# Patient Record
Sex: FEMALE | Race: WHITE | NOT HISPANIC OR LATINO | Employment: OTHER | URBAN - METROPOLITAN AREA
[De-identification: names, ages, dates, MRNs, and addresses within clinical notes are randomized per-mention and may not be internally consistent; named-entity substitution may affect disease eponyms.]

---

## 2021-09-22 ENCOUNTER — HOSPITAL ENCOUNTER (EMERGENCY)
Facility: HOSPITAL | Age: 51
Discharge: HOME | End: 2021-09-22
Attending: EMERGENCY MEDICINE | Admitting: EMERGENCY MEDICINE

## 2021-09-22 ENCOUNTER — APPOINTMENT (OUTPATIENT)
Dept: CT IMAGING | Facility: HOSPITAL | Age: 51
End: 2021-09-22

## 2021-09-22 VITALS
HEART RATE: 66 BPM | RESPIRATION RATE: 16 BRPM | OXYGEN SATURATION: 100 % | TEMPERATURE: 97.5 F | DIASTOLIC BLOOD PRESSURE: 64 MMHG | BODY MASS INDEX: 20.14 KG/M2 | HEIGHT: 64 IN | SYSTOLIC BLOOD PRESSURE: 128 MMHG | WEIGHT: 118 LBS

## 2021-09-22 DIAGNOSIS — R10.13 EPIGASTRIC PAIN: Primary | ICD-10-CM

## 2021-09-22 DIAGNOSIS — R11.0 NAUSEA: ICD-10-CM

## 2021-09-22 LAB
ALBUMIN SERPL-MCNC: 4.4 G/DL (ref 3.5–5.2)
ALBUMIN/GLOB SERPL: 1.4 G/DL
ALP SERPL-CCNC: 92 U/L (ref 39–117)
ALT SERPL W P-5'-P-CCNC: 13 U/L (ref 1–33)
AMPHET+METHAMPHET UR QL: NEGATIVE
ANION GAP SERPL CALCULATED.3IONS-SCNC: 11.6 MMOL/L (ref 5–15)
AST SERPL-CCNC: 14 U/L (ref 1–32)
BARBITURATES UR QL SCN: NEGATIVE
BASOPHILS # BLD AUTO: 0.03 10*3/MM3 (ref 0–0.2)
BASOPHILS NFR BLD AUTO: 0.6 % (ref 0–1.5)
BENZODIAZ UR QL SCN: NEGATIVE
BILIRUB SERPL-MCNC: 0.2 MG/DL (ref 0–1.2)
BILIRUB UR QL STRIP: NEGATIVE
BUN SERPL-MCNC: 11 MG/DL (ref 6–20)
BUN/CREAT SERPL: 12.6 (ref 7–25)
CALCIUM SPEC-SCNC: 9.9 MG/DL (ref 8.6–10.5)
CANNABINOIDS SERPL QL: NEGATIVE
CHLORIDE SERPL-SCNC: 107 MMOL/L (ref 98–107)
CLARITY UR: CLEAR
CO2 SERPL-SCNC: 24.4 MMOL/L (ref 22–29)
COCAINE UR QL: NEGATIVE
COLOR UR: YELLOW
CREAT SERPL-MCNC: 0.87 MG/DL (ref 0.57–1)
D-LACTATE SERPL-SCNC: 1.1 MMOL/L (ref 0.5–2)
DEPRECATED RDW RBC AUTO: 43.4 FL (ref 37–54)
EOSINOPHIL # BLD AUTO: 0.01 10*3/MM3 (ref 0–0.4)
EOSINOPHIL NFR BLD AUTO: 0.2 % (ref 0.3–6.2)
ERYTHROCYTE [DISTWIDTH] IN BLOOD BY AUTOMATED COUNT: 12.5 % (ref 12.3–15.4)
ETHANOL BLD-MCNC: <10 MG/DL (ref 0–10)
ETHANOL UR QL: <0.01 %
GFR SERPL CREATININE-BSD FRML MDRD: 68 ML/MIN/1.73
GLOBULIN UR ELPH-MCNC: 3.2 GM/DL
GLUCOSE SERPL-MCNC: 99 MG/DL (ref 65–99)
GLUCOSE UR STRIP-MCNC: NEGATIVE MG/DL
HCT VFR BLD AUTO: 37.8 % (ref 34–46.6)
HGB BLD-MCNC: 12.7 G/DL (ref 12–15.9)
HGB UR QL STRIP.AUTO: NEGATIVE
IMM GRANULOCYTES # BLD AUTO: 0.01 10*3/MM3 (ref 0–0.05)
IMM GRANULOCYTES NFR BLD AUTO: 0.2 % (ref 0–0.5)
KETONES UR QL STRIP: NEGATIVE
LEUKOCYTE ESTERASE UR QL STRIP.AUTO: NEGATIVE
LIPASE SERPL-CCNC: 34 U/L (ref 13–60)
LYMPHOCYTES # BLD AUTO: 1.2 10*3/MM3 (ref 0.7–3.1)
LYMPHOCYTES NFR BLD AUTO: 23.3 % (ref 19.6–45.3)
MAGNESIUM SERPL-MCNC: 2.2 MG/DL (ref 1.6–2.6)
MCH RBC QN AUTO: 31.5 PG (ref 26.6–33)
MCHC RBC AUTO-ENTMCNC: 33.6 G/DL (ref 31.5–35.7)
MCV RBC AUTO: 93.8 FL (ref 79–97)
METHADONE UR QL SCN: NEGATIVE
MONOCYTES # BLD AUTO: 0.41 10*3/MM3 (ref 0.1–0.9)
MONOCYTES NFR BLD AUTO: 7.9 % (ref 5–12)
NEUTROPHILS NFR BLD AUTO: 3.5 10*3/MM3 (ref 1.7–7)
NEUTROPHILS NFR BLD AUTO: 67.8 % (ref 42.7–76)
NITRITE UR QL STRIP: NEGATIVE
NRBC BLD AUTO-RTO: 0 /100 WBC (ref 0–0.2)
OPIATES UR QL: NEGATIVE
OXYCODONE UR QL SCN: NEGATIVE
PH UR STRIP.AUTO: 8.5 [PH] (ref 5–8)
PLATELET # BLD AUTO: 299 10*3/MM3 (ref 140–450)
PMV BLD AUTO: 8.9 FL (ref 6–12)
POTASSIUM SERPL-SCNC: 3.7 MMOL/L (ref 3.5–5.2)
PROCALCITONIN SERPL-MCNC: 0.05 NG/ML (ref 0–0.25)
PROT SERPL-MCNC: 7.6 G/DL (ref 6–8.5)
PROT UR QL STRIP: NEGATIVE
RBC # BLD AUTO: 4.03 10*6/MM3 (ref 3.77–5.28)
SODIUM SERPL-SCNC: 143 MMOL/L (ref 136–145)
SP GR UR STRIP: <1.005 (ref 1–1.03)
UROBILINOGEN UR QL STRIP: ABNORMAL
WBC # BLD AUTO: 5.16 10*3/MM3 (ref 3.4–10.8)

## 2021-09-22 PROCEDURE — 83605 ASSAY OF LACTIC ACID: CPT | Performed by: EMERGENCY MEDICINE

## 2021-09-22 PROCEDURE — 74177 CT ABD & PELVIS W/CONTRAST: CPT

## 2021-09-22 PROCEDURE — 85025 COMPLETE CBC W/AUTO DIFF WBC: CPT | Performed by: EMERGENCY MEDICINE

## 2021-09-22 PROCEDURE — 80307 DRUG TEST PRSMV CHEM ANLYZR: CPT | Performed by: EMERGENCY MEDICINE

## 2021-09-22 PROCEDURE — 84145 PROCALCITONIN (PCT): CPT | Performed by: EMERGENCY MEDICINE

## 2021-09-22 PROCEDURE — 25010000002 IOPAMIDOL 61 % SOLUTION: Performed by: EMERGENCY MEDICINE

## 2021-09-22 PROCEDURE — 99283 EMERGENCY DEPT VISIT LOW MDM: CPT

## 2021-09-22 PROCEDURE — 96375 TX/PRO/DX INJ NEW DRUG ADDON: CPT

## 2021-09-22 PROCEDURE — 96374 THER/PROPH/DIAG INJ IV PUSH: CPT

## 2021-09-22 PROCEDURE — 80053 COMPREHEN METABOLIC PANEL: CPT | Performed by: EMERGENCY MEDICINE

## 2021-09-22 PROCEDURE — 82077 ASSAY SPEC XCP UR&BREATH IA: CPT | Performed by: EMERGENCY MEDICINE

## 2021-09-22 PROCEDURE — 81003 URINALYSIS AUTO W/O SCOPE: CPT | Performed by: EMERGENCY MEDICINE

## 2021-09-22 PROCEDURE — 83735 ASSAY OF MAGNESIUM: CPT | Performed by: EMERGENCY MEDICINE

## 2021-09-22 PROCEDURE — 83690 ASSAY OF LIPASE: CPT | Performed by: EMERGENCY MEDICINE

## 2021-09-22 PROCEDURE — 25010000002 ONDANSETRON PER 1 MG: Performed by: EMERGENCY MEDICINE

## 2021-09-22 RX ORDER — METOCLOPRAMIDE 5 MG/1
5 TABLET ORAL 3 TIMES DAILY PRN
Qty: 21 TABLET | Refills: 0 | Status: SHIPPED | OUTPATIENT
Start: 2021-09-22 | End: 2021-09-22

## 2021-09-22 RX ORDER — NIZATIDINE 150 MG/1
150 CAPSULE ORAL DAILY
COMMUNITY
Start: 2021-09-21 | End: 2021-10-03

## 2021-09-22 RX ORDER — HYDROXYZINE HYDROCHLORIDE 25 MG/1
50 TABLET, FILM COATED ORAL
COMMUNITY
Start: 2021-09-21 | End: 2021-10-03

## 2021-09-22 RX ORDER — PROMETHAZINE HYDROCHLORIDE 25 MG/1
25 TABLET ORAL EVERY 6 HOURS PRN
Qty: 21 TABLET | Refills: 0 | Status: SHIPPED | OUTPATIENT
Start: 2021-09-22 | End: 2021-10-01

## 2021-09-22 RX ORDER — PROMETHAZINE HYDROCHLORIDE 25 MG/1
25 TABLET ORAL
COMMUNITY
Start: 2021-09-21 | End: 2021-09-22

## 2021-09-22 RX ORDER — FAMOTIDINE 10 MG/ML
20 INJECTION, SOLUTION INTRAVENOUS ONCE
Status: COMPLETED | OUTPATIENT
Start: 2021-09-22 | End: 2021-09-22

## 2021-09-22 RX ORDER — SUCRALFATE 1 G/1
1 TABLET ORAL 4 TIMES DAILY
Qty: 120 TABLET | Refills: 0 | Status: SHIPPED | OUTPATIENT
Start: 2021-09-22 | End: 2021-10-03

## 2021-09-22 RX ORDER — OMEPRAZOLE 40 MG/1
40 CAPSULE, DELAYED RELEASE ORAL DAILY
COMMUNITY
Start: 2021-09-17 | End: 2021-10-11 | Stop reason: SINTOL

## 2021-09-22 RX ORDER — HYDROCODONE BITARTRATE AND ACETAMINOPHEN 5; 325 MG/1; MG/1
1 TABLET ORAL ONCE
Status: COMPLETED | OUTPATIENT
Start: 2021-09-22 | End: 2021-09-22

## 2021-09-22 RX ORDER — ONDANSETRON 2 MG/ML
4 INJECTION INTRAMUSCULAR; INTRAVENOUS ONCE
Status: COMPLETED | OUTPATIENT
Start: 2021-09-22 | End: 2021-09-22

## 2021-09-22 RX ORDER — ONDANSETRON 4 MG/1
4 TABLET, ORALLY DISINTEGRATING ORAL
Status: ON HOLD | COMMUNITY
Start: 2021-09-21 | End: 2021-09-26 | Stop reason: SDUPTHER

## 2021-09-22 RX ADMIN — SODIUM CHLORIDE, POTASSIUM CHLORIDE, SODIUM LACTATE AND CALCIUM CHLORIDE 1000 ML: 600; 310; 30; 20 INJECTION, SOLUTION INTRAVENOUS at 11:39

## 2021-09-22 RX ADMIN — HYDROCODONE BITARTRATE AND ACETAMINOPHEN 1 TABLET: 5; 325 TABLET ORAL at 13:56

## 2021-09-22 RX ADMIN — ONDANSETRON 4 MG: 2 INJECTION INTRAMUSCULAR; INTRAVENOUS at 11:38

## 2021-09-22 RX ADMIN — FAMOTIDINE 20 MG: 10 INJECTION INTRAVENOUS at 11:42

## 2021-09-22 RX ADMIN — IOPAMIDOL 85 ML: 612 INJECTION, SOLUTION INTRAVENOUS at 12:38

## 2021-09-22 NOTE — DISCHARGE INSTRUCTIONS
Take medications as prescribed, stop taking the Phenergan, continue all other current medications, GI follow-up as discussed, PCP for establishment as discussed, ED return for worsening symptoms as needed.

## 2021-09-22 NOTE — ED NOTES
Pt wearing mask. Myself had mask, and eye wear/PPE when present with pt. Hand hygiene performed before and after pt care.     Jessica Mathews, PCT  09/22/21 1104

## 2021-09-22 NOTE — ED PROVIDER NOTES
EMERGENCY DEPARTMENT ENCOUNTER    Room Number:  13/13  Date of encounter:  9/22/2021  PCP: Provider, No Known  Historian: Patient      HPI:  Chief Complaint: Abdominal pain  A complete HPI/ROS/PMH/PSH/SH/FH are unobtainable due to: None    Context: Mary Jensen is a 51 y.o. female who presents to the ED via private vehicle for evaluation for increasing abdominal pain since Friday described as epigastric in nature with some radiation up into the chest as well as down into the abdomen.  Has had severe nausea without vomiting or diarrhea.  Regular solid bowel movements without black or bloody stools.  No dysuria, decreased solid food intake but is able to drink fluids.  No fevers, chills.  Was started on omeprazole by her ENT who is she had seen for some ear issues recently, symptoms started couple weeks ago during her course of antibiotics and steroids for a sinus infection.  They did improve and now have recurred.  No similar symptoms in the past.    MEDICAL RECORD REVIEW    Chart reviewed in epic with prescription for Zofran, Atarax, Axid, and Phenergan yesterday    PAST MEDICAL HISTORY  Active Ambulatory Problems     Diagnosis Date Noted   • No Active Ambulatory Problems     Resolved Ambulatory Problems     Diagnosis Date Noted   • No Resolved Ambulatory Problems     No Additional Past Medical History         PAST SURGICAL HISTORY  No past surgical history on file.      FAMILY HISTORY  No family history on file.      SOCIAL HISTORY  Social History     Socioeconomic History   • Marital status:      Spouse name: Not on file   • Number of children: Not on file   • Years of education: Not on file   • Highest education level: Not on file         ALLERGIES  Patient has no known allergies.        REVIEW OF SYSTEMS  Review of Systems     All systems reviewed and negative except for those discussed in HPI.       PHYSICAL EXAM    I have reviewed the triage vital signs and nursing notes.    ED Triage Vitals   Temp  Heart Rate Resp BP SpO2   09/22/21 1000 09/22/21 1000 09/22/21 1000 09/22/21 1109 09/22/21 1000   97.5 °F (36.4 °C) 111 18 128/64 100 %      Temp src Heart Rate Source Patient Position BP Location FiO2 (%)   09/22/21 1000 09/22/21 1109 09/22/21 1109 09/22/21 1109 --   Infrared Monitor Lying Right arm        Physical Exam  General: Awake, alert, appears uncomfortable, nontoxic  HEENT: Mucous membranes moist, atraumatic, EOMI  Neck: Full ROM  Pulm: Symmetric chest rise, nonlabored, lungs CTAB  Cardiovascular: Regular rate and rhythm, intact distal pulses  GI: Soft, nontender, nondistended, no rebound, no guarding, bowel sounds present  MSK: Full ROM, no deformity  Skin: Warm, dry  Neuro: Alert and oriented x 3, GCS 15, moving all extremities, no focal deficits  Psych: Calm, cooperative      N95, protective eye goggles, and gloves used during this encounter. Patient in surgical mask.      LAB RESULTS  Recent Results (from the past 24 hour(s))   Urinalysis With Microscopic If Indicated (No Culture) - Urine, Clean Catch    Collection Time: 09/22/21 11:01 AM    Specimen: Urine, Clean Catch   Result Value Ref Range    Color, UA Yellow Yellow, Straw    Appearance, UA Clear Clear    pH, UA 8.5 (H) 5.0 - 8.0    Specific Gravity, UA <1.005 (L) 1.005 - 1.030    Glucose, UA Negative Negative    Ketones, UA Negative Negative    Bilirubin, UA Negative Negative    Blood, UA Negative Negative    Protein, UA Negative Negative    Leuk Esterase, UA Negative Negative    Nitrite, UA Negative Negative    Urobilinogen, UA 0.2 E.U./dL 0.2 - 1.0 E.U./dL   Urine Drug Screen - Urine, Clean Catch    Collection Time: 09/22/21 11:01 AM    Specimen: Urine, Clean Catch   Result Value Ref Range    Amphet/Methamphet, Screen Negative Negative    Barbiturates Screen, Urine Negative Negative    Benzodiazepine Screen, Urine Negative Negative    Cocaine Screen, Urine Negative Negative    Opiate Screen Negative Negative    THC, Screen, Urine Negative  Negative    Methadone Screen, Urine Negative Negative    Oxycodone Screen, Urine Negative Negative   Comprehensive Metabolic Panel    Collection Time: 09/22/21 11:20 AM    Specimen: Blood   Result Value Ref Range    Glucose 99 65 - 99 mg/dL    BUN 11 6 - 20 mg/dL    Creatinine 0.87 0.57 - 1.00 mg/dL    Sodium 143 136 - 145 mmol/L    Potassium 3.7 3.5 - 5.2 mmol/L    Chloride 107 98 - 107 mmol/L    CO2 24.4 22.0 - 29.0 mmol/L    Calcium 9.9 8.6 - 10.5 mg/dL    Total Protein 7.6 6.0 - 8.5 g/dL    Albumin 4.40 3.50 - 5.20 g/dL    ALT (SGPT) 13 1 - 33 U/L    AST (SGOT) 14 1 - 32 U/L    Alkaline Phosphatase 92 39 - 117 U/L    Total Bilirubin 0.2 0.0 - 1.2 mg/dL    eGFR Non African Amer 68 >60 mL/min/1.73    Globulin 3.2 gm/dL    A/G Ratio 1.4 g/dL    BUN/Creatinine Ratio 12.6 7.0 - 25.0    Anion Gap 11.6 5.0 - 15.0 mmol/L   Lipase    Collection Time: 09/22/21 11:20 AM    Specimen: Blood   Result Value Ref Range    Lipase 34 13 - 60 U/L   Lactic Acid, Plasma    Collection Time: 09/22/21 11:20 AM    Specimen: Blood   Result Value Ref Range    Lactate 1.1 0.5 - 2.0 mmol/L   Procalcitonin    Collection Time: 09/22/21 11:20 AM    Specimen: Blood   Result Value Ref Range    Procalcitonin 0.05 0.00 - 0.25 ng/mL   Ethanol    Collection Time: 09/22/21 11:20 AM    Specimen: Blood   Result Value Ref Range    Ethanol <10 0 - 10 mg/dL    Ethanol % <0.010 %   Magnesium    Collection Time: 09/22/21 11:20 AM    Specimen: Blood   Result Value Ref Range    Magnesium 2.2 1.6 - 2.6 mg/dL   CBC Auto Differential    Collection Time: 09/22/21 11:20 AM    Specimen: Blood   Result Value Ref Range    WBC 5.16 3.40 - 10.80 10*3/mm3    RBC 4.03 3.77 - 5.28 10*6/mm3    Hemoglobin 12.7 12.0 - 15.9 g/dL    Hematocrit 37.8 34.0 - 46.6 %    MCV 93.8 79.0 - 97.0 fL    MCH 31.5 26.6 - 33.0 pg    MCHC 33.6 31.5 - 35.7 g/dL    RDW 12.5 12.3 - 15.4 %    RDW-SD 43.4 37.0 - 54.0 fl    MPV 8.9 6.0 - 12.0 fL    Platelets 299 140 - 450 10*3/mm3    Neutrophil %  67.8 42.7 - 76.0 %    Lymphocyte % 23.3 19.6 - 45.3 %    Monocyte % 7.9 5.0 - 12.0 %    Eosinophil % 0.2 (L) 0.3 - 6.2 %    Basophil % 0.6 0.0 - 1.5 %    Immature Grans % 0.2 0.0 - 0.5 %    Neutrophils, Absolute 3.50 1.70 - 7.00 10*3/mm3    Lymphocytes, Absolute 1.20 0.70 - 3.10 10*3/mm3    Monocytes, Absolute 0.41 0.10 - 0.90 10*3/mm3    Eosinophils, Absolute 0.01 0.00 - 0.40 10*3/mm3    Basophils, Absolute 0.03 0.00 - 0.20 10*3/mm3    Immature Grans, Absolute 0.01 0.00 - 0.05 10*3/mm3    nRBC 0.0 0.0 - 0.2 /100 WBC       Ordered the above labs and independently reviewed the results.        RADIOLOGY  CT Abdomen Pelvis With Contrast    Result Date: 9/22/2021  CT ABDOMEN PELVIS W CONTRAST-  CLINICAL HISTORY: Nausea and vomiting. Abdominal pain  TECHNIQUE: Spiral CT images were acquired through the abdomen and pelvis with IV contrast only and were reconstructed in 3 mm thick axial slices.  Radiation dose reduction techniques were utilized, including automated exposure control and exposure modulation based on body size.  COMPARISON: None  FINDINGS: The liver, spleen, pancreas, kidneys, and adrenal glands appear within normal limits. The stomach and small and large bowel are unremarkable. There is no bowel distention. The appendix is well-seen and appears normal. Uterus and ovaries are within normal limits. There are no abnormal masses or fluid collections in the abdomen or pelvis. There are no hernias.      Normal CT scan of the abdomen and pelvis.  This report was finalized on 9/22/2021 1:06 PM by Dr. Adryan Christianson M.D.        I ordered the above noted radiological studies. Reviewed by me.  See dictation for official radiology interpretation.      PROCEDURES    Procedures      MEDICATIONS GIVEN IN ER    Medications   lactated ringers bolus 1,000 mL (0 mL Intravenous Stopped 9/22/21 1356)   ondansetron (ZOFRAN) injection 4 mg (4 mg Intravenous Given 9/22/21 1138)   famotidine (PEPCID) injection 20 mg (20 mg  Intravenous Given 9/22/21 1142)   iopamidol (ISOVUE-300) 61 % injection 100 mL (85 mL Intravenous Given by Other 9/22/21 1238)   HYDROcodone-acetaminophen (NORCO) 5-325 MG per tablet 1 tablet (1 tablet Oral Given 9/22/21 1356)         PROGRESS, DATA ANALYSIS, CONSULTS, AND MEDICAL DECISION MAKING    All labs have been independently reviewed by me.  All radiology studies have been reviewed by me and discussed with radiologist dictating the report.   EKG's independently viewed and interpreted by me.  Discussion below represents my analysis of pertinent findings related to patient's condition, differential diagnosis, treatment plan and final disposition.    Initial concern for cholelithiasis versus cholecystitis, pancreatitis, gastritis, peptic ulcer, esophagitis, among others.    ED Course as of Sep 22 1437   Wed Sep 22, 2021   1149 WBC: 5.16 [DC]   1149 Hemoglobin: 12.7 [DC]   1149 Ketones, UA: Negative [DC]   1238 Glucose: 99 [DC]   1238 BUN: 11 [DC]   1238 Creatinine: 0.87 [DC]   1238 CO2: 24.4 [DC]   1238 Anion Gap: 11.6 [DC]   1238 Lactate: 1.1 [DC]   1314 Discussed with Dr. Christianson, radiologist, no acute findings on CT scan.    [DC]      ED Course User Index  [DC] Victor M Grimes MD     Patient with much improved appearance overall, no emergent findings on labs or imaging today.  Suspect gastritis versus peptic ulcer, no suspicion for ruptured at this time.  Will place on Carafate, advised compliance with previously prescribed omeprazole, GI follow-up, and ED return for worsening symptoms as needed.  PCP establishment recommend.     AS OF 14:37 EDT VITALS:    BP - 128/64  HR - 66  TEMP - 97.5 °F (36.4 °C) (Infrared)  02 SATS - 100%        DIAGNOSIS  Final diagnoses:   Epigastric pain   Nausea         DISPOSITION  DISCHARGE    Patient discharged in stable condition.    Reviewed implications of results, diagnosis, meds, responsibility to follow up, warning signs and symptoms of possible worsening, potential  complications and reasons to return to ER.    Patient/Family voiced understanding of above instructions.    Discussed plan for discharge, as there is no emergent indication for admission. Patient referred to primary care provider for BP management due to today's BP. Pt/family is agreeable and understands need for follow up and repeat testing.  Pt is aware that discharge does not mean that nothing is wrong but it indicates no emergency is present that requires admission and they must continue care with follow-up as given below or physician of their choice.     FOLLOW-UP  [facility] Emergency Department  [address]  [facility] [id number] [phone]    As needed, If symptoms worsen    PATIENT CONNECTION - [place] [address]  [phone]  Call   To establish PCP as needed    [facility]YS[facility] REFERR[facility]  [place] [address]  [phone]  Call   To establish PCP as needed    [facility]  [address]  [address]  [facility] [address] [phone]  Schedule an appointment as soon as possible for a visit            Medication List      New Prescriptions    promethazine 25 MG tablet  Commonly known as: PHENERGAN  Take 1 tablet by mouth Every 6 (Six) Hours As Needed for Nausea or Vomiting.     sucralfate 1 g tablet  Commonly known as: CARAFATE  Take 1 tablet by mouth 4 (Four) Times a Day.           Where to Get Your Medications      You can get these medications from any pharmacy    Bring a paper prescription for each of these medications  · promethazine 25 MG tablet  · sucralfate 1 g tablet                    Victor M Grimes MD  09/22/21 2706

## 2021-09-22 NOTE — ED TRIAGE NOTES
Pt to ER via PV. Pt c/o generalized abdominal pain that started this past Friday. Pt c/o nausea, no vomiting.     Patient in mask. This RN in appropriate PPE throughout the patient's entire encounter.

## 2021-09-25 ENCOUNTER — APPOINTMENT (OUTPATIENT)
Dept: GENERAL RADIOLOGY | Facility: HOSPITAL | Age: 51
End: 2021-09-25

## 2021-09-25 ENCOUNTER — HOSPITAL ENCOUNTER (OUTPATIENT)
Facility: HOSPITAL | Age: 51
Discharge: HOME | End: 2021-09-26
Attending: EMERGENCY MEDICINE | Admitting: SURGERY

## 2021-09-25 ENCOUNTER — ANESTHESIA EVENT (OUTPATIENT)
Dept: PERIOP | Facility: HOSPITAL | Age: 51
End: 2021-09-25

## 2021-09-25 ENCOUNTER — APPOINTMENT (OUTPATIENT)
Dept: CT IMAGING | Facility: HOSPITAL | Age: 51
End: 2021-09-25

## 2021-09-25 ENCOUNTER — ANESTHESIA (OUTPATIENT)
Dept: PERIOP | Facility: HOSPITAL | Age: 51
End: 2021-09-25

## 2021-09-25 ENCOUNTER — APPOINTMENT (OUTPATIENT)
Dept: ULTRASOUND IMAGING | Facility: HOSPITAL | Age: 51
End: 2021-09-25

## 2021-09-25 DIAGNOSIS — K80.10 CALCULUS OF GALLBLADDER WITH CHOLECYSTITIS WITHOUT BILIARY OBSTRUCTION, UNSPECIFIED CHOLECYSTITIS ACUITY: Primary | ICD-10-CM

## 2021-09-25 DIAGNOSIS — K80.12 CALCULUS OF GALLBLADDER WITH ACUTE ON CHRONIC CHOLECYSTITIS WITHOUT OBSTRUCTION: ICD-10-CM

## 2021-09-25 DIAGNOSIS — K80.50 BILIARY COLIC: ICD-10-CM

## 2021-09-25 DIAGNOSIS — R10.13 EPIGASTRIC ABDOMINAL PAIN: ICD-10-CM

## 2021-09-25 LAB
ALBUMIN SERPL-MCNC: 4.5 G/DL (ref 3.5–5.2)
ALBUMIN/GLOB SERPL: 1.3 G/DL
ALP SERPL-CCNC: 93 U/L (ref 39–117)
ALT SERPL W P-5'-P-CCNC: 13 U/L (ref 1–33)
ANION GAP SERPL CALCULATED.3IONS-SCNC: 12.7 MMOL/L (ref 5–15)
AST SERPL-CCNC: 17 U/L (ref 1–32)
BASOPHILS # BLD AUTO: 0.01 10*3/MM3 (ref 0–0.2)
BASOPHILS NFR BLD AUTO: 0.2 % (ref 0–1.5)
BILIRUB SERPL-MCNC: 0.3 MG/DL (ref 0–1.2)
BILIRUB UR QL STRIP: NEGATIVE
BUN SERPL-MCNC: 9 MG/DL (ref 6–20)
BUN/CREAT SERPL: 11.8 (ref 7–25)
CALCIUM SPEC-SCNC: 9.9 MG/DL (ref 8.6–10.5)
CHLORIDE SERPL-SCNC: 105 MMOL/L (ref 98–107)
CLARITY UR: CLEAR
CO2 SERPL-SCNC: 22.3 MMOL/L (ref 22–29)
COLOR UR: YELLOW
CREAT SERPL-MCNC: 0.76 MG/DL (ref 0.57–1)
D DIMER PPP FEU-MCNC: 0.54 MCGFEU/ML (ref 0–0.49)
DEPRECATED RDW RBC AUTO: 43.7 FL (ref 37–54)
EOSINOPHIL # BLD AUTO: 0.06 10*3/MM3 (ref 0–0.4)
EOSINOPHIL NFR BLD AUTO: 1.3 % (ref 0.3–6.2)
ERYTHROCYTE [DISTWIDTH] IN BLOOD BY AUTOMATED COUNT: 12.6 % (ref 12.3–15.4)
GFR SERPL CREATININE-BSD FRML MDRD: 80 ML/MIN/1.73
GLOBULIN UR ELPH-MCNC: 3.4 GM/DL
GLUCOSE SERPL-MCNC: 91 MG/DL (ref 65–99)
GLUCOSE UR STRIP-MCNC: NEGATIVE MG/DL
HCT VFR BLD AUTO: 40.7 % (ref 34–46.6)
HGB BLD-MCNC: 13.6 G/DL (ref 12–15.9)
HGB UR QL STRIP.AUTO: NEGATIVE
IMM GRANULOCYTES # BLD AUTO: 0 10*3/MM3 (ref 0–0.05)
IMM GRANULOCYTES NFR BLD AUTO: 0 % (ref 0–0.5)
KETONES UR QL STRIP: NEGATIVE
LEUKOCYTE ESTERASE UR QL STRIP.AUTO: NEGATIVE
LIPASE SERPL-CCNC: 45 U/L (ref 13–60)
LYMPHOCYTES # BLD AUTO: 1.44 10*3/MM3 (ref 0.7–3.1)
LYMPHOCYTES NFR BLD AUTO: 30.9 % (ref 19.6–45.3)
MCH RBC QN AUTO: 31.2 PG (ref 26.6–33)
MCHC RBC AUTO-ENTMCNC: 33.4 G/DL (ref 31.5–35.7)
MCV RBC AUTO: 93.3 FL (ref 79–97)
MONOCYTES # BLD AUTO: 0.34 10*3/MM3 (ref 0.1–0.9)
MONOCYTES NFR BLD AUTO: 7.3 % (ref 5–12)
NEUTROPHILS NFR BLD AUTO: 2.81 10*3/MM3 (ref 1.7–7)
NEUTROPHILS NFR BLD AUTO: 60.3 % (ref 42.7–76)
NITRITE UR QL STRIP: NEGATIVE
NRBC BLD AUTO-RTO: 0 /100 WBC (ref 0–0.2)
PH UR STRIP.AUTO: 8 [PH] (ref 5–8)
PLATELET # BLD AUTO: 313 10*3/MM3 (ref 140–450)
PMV BLD AUTO: 8.8 FL (ref 6–12)
POTASSIUM SERPL-SCNC: 3.7 MMOL/L (ref 3.5–5.2)
PROT SERPL-MCNC: 7.9 G/DL (ref 6–8.5)
PROT UR QL STRIP: NEGATIVE
RBC # BLD AUTO: 4.36 10*6/MM3 (ref 3.77–5.28)
SARS-COV-2 RNA PNL SPEC NAA+PROBE: NOT DETECTED
SODIUM SERPL-SCNC: 140 MMOL/L (ref 136–145)
SP GR UR STRIP: >=1.03 (ref 1–1.03)
TROPONIN T SERPL-MCNC: <0.01 NG/ML (ref 0–0.03)
UROBILINOGEN UR QL STRIP: NORMAL
WBC # BLD AUTO: 4.66 10*3/MM3 (ref 3.4–10.8)

## 2021-09-25 PROCEDURE — 25010000002 MORPHINE PER 10 MG: Performed by: EMERGENCY MEDICINE

## 2021-09-25 PROCEDURE — 96375 TX/PRO/DX INJ NEW DRUG ADDON: CPT

## 2021-09-25 PROCEDURE — 25010000002 DEXAMETHASONE PER 1 MG: Performed by: NURSE ANESTHETIST, CERTIFIED REGISTERED

## 2021-09-25 PROCEDURE — 96376 TX/PRO/DX INJ SAME DRUG ADON: CPT

## 2021-09-25 PROCEDURE — 99284 EMERGENCY DEPT VISIT MOD MDM: CPT

## 2021-09-25 PROCEDURE — 47562 LAPAROSCOPIC CHOLECYSTECTOMY: CPT | Performed by: SURGERY

## 2021-09-25 PROCEDURE — G0378 HOSPITAL OBSERVATION PER HR: HCPCS

## 2021-09-25 PROCEDURE — C1889 IMPLANT/INSERT DEVICE, NOC: HCPCS | Performed by: SURGERY

## 2021-09-25 PROCEDURE — 0 IOTHALAMATE 60 % SOLUTION: Performed by: SURGERY

## 2021-09-25 PROCEDURE — 93005 ELECTROCARDIOGRAM TRACING: CPT | Performed by: EMERGENCY MEDICINE

## 2021-09-25 PROCEDURE — 47562 LAPAROSCOPIC CHOLECYSTECTOMY: CPT | Performed by: SPECIALIST/TECHNOLOGIST, OTHER

## 2021-09-25 PROCEDURE — 76705 ECHO EXAM OF ABDOMEN: CPT

## 2021-09-25 PROCEDURE — 93010 ELECTROCARDIOGRAM REPORT: CPT | Performed by: INTERNAL MEDICINE

## 2021-09-25 PROCEDURE — 25010000002 ONDANSETRON PER 1 MG: Performed by: NURSE ANESTHETIST, CERTIFIED REGISTERED

## 2021-09-25 PROCEDURE — 25010000002 KETOROLAC TROMETHAMINE PER 15 MG: Performed by: SURGERY

## 2021-09-25 PROCEDURE — 25010000002 ONDANSETRON PER 1 MG: Performed by: PHYSICIAN ASSISTANT

## 2021-09-25 PROCEDURE — 25010000002 ONDANSETRON PER 1 MG: Performed by: SURGERY

## 2021-09-25 PROCEDURE — 99219 PR INITIAL OBSERVATION CARE/DAY 50 MINUTES: CPT | Performed by: SURGERY

## 2021-09-25 PROCEDURE — 25010000002 NEOSTIGMINE 5 MG/10ML SOLUTION: Performed by: NURSE ANESTHETIST, CERTIFIED REGISTERED

## 2021-09-25 PROCEDURE — 25010000002 MIDAZOLAM PER 1 MG: Performed by: ANESTHESIOLOGY

## 2021-09-25 PROCEDURE — 25010000003 CEFAZOLIN IN DEXTROSE 2-4 GM/100ML-% SOLUTION: Performed by: SURGERY

## 2021-09-25 PROCEDURE — C9803 HOPD COVID-19 SPEC COLLECT: HCPCS

## 2021-09-25 PROCEDURE — 85025 COMPLETE CBC W/AUTO DIFF WBC: CPT | Performed by: PHYSICIAN ASSISTANT

## 2021-09-25 PROCEDURE — 80053 COMPREHEN METABOLIC PANEL: CPT | Performed by: PHYSICIAN ASSISTANT

## 2021-09-25 PROCEDURE — 25010000002 PHENYLEPHRINE PER 1 ML: Performed by: NURSE ANESTHETIST, CERTIFIED REGISTERED

## 2021-09-25 PROCEDURE — 84484 ASSAY OF TROPONIN QUANT: CPT | Performed by: PHYSICIAN ASSISTANT

## 2021-09-25 PROCEDURE — 81003 URINALYSIS AUTO W/O SCOPE: CPT | Performed by: PHYSICIAN ASSISTANT

## 2021-09-25 PROCEDURE — 71275 CT ANGIOGRAPHY CHEST: CPT

## 2021-09-25 PROCEDURE — 74177 CT ABD & PELVIS W/CONTRAST: CPT

## 2021-09-25 PROCEDURE — 25010000002 HYDROMORPHONE PER 4 MG: Performed by: SURGERY

## 2021-09-25 PROCEDURE — 88304 TISSUE EXAM BY PATHOLOGIST: CPT | Performed by: SURGERY

## 2021-09-25 PROCEDURE — 87635 SARS-COV-2 COVID-19 AMP PRB: CPT | Performed by: PHYSICIAN ASSISTANT

## 2021-09-25 PROCEDURE — 83690 ASSAY OF LIPASE: CPT | Performed by: PHYSICIAN ASSISTANT

## 2021-09-25 PROCEDURE — 0 IOPAMIDOL PER 1 ML: Performed by: EMERGENCY MEDICINE

## 2021-09-25 PROCEDURE — 25010000002 ONDANSETRON PER 1 MG: Performed by: EMERGENCY MEDICINE

## 2021-09-25 PROCEDURE — 25010000002 FENTANYL CITRATE (PF) 50 MCG/ML SOLUTION: Performed by: ANESTHESIOLOGY

## 2021-09-25 PROCEDURE — 25010000002 KETOROLAC TROMETHAMINE PER 15 MG: Performed by: NURSE ANESTHETIST, CERTIFIED REGISTERED

## 2021-09-25 PROCEDURE — 25010000002 PROPOFOL 10 MG/ML EMULSION: Performed by: NURSE ANESTHETIST, CERTIFIED REGISTERED

## 2021-09-25 PROCEDURE — 85379 FIBRIN DEGRADATION QUANT: CPT | Performed by: PHYSICIAN ASSISTANT

## 2021-09-25 PROCEDURE — 71045 X-RAY EXAM CHEST 1 VIEW: CPT

## 2021-09-25 PROCEDURE — 96374 THER/PROPH/DIAG INJ IV PUSH: CPT

## 2021-09-25 PROCEDURE — 25010000002 HYDROMORPHONE PER 4 MG: Performed by: NURSE ANESTHETIST, CERTIFIED REGISTERED

## 2021-09-25 PROCEDURE — 25010000002 FENTANYL CITRATE (PF) 50 MCG/ML SOLUTION: Performed by: NURSE ANESTHETIST, CERTIFIED REGISTERED

## 2021-09-25 DEVICE — LIGAMAX 5 MM ENDOSCOPIC MULTIPLE CLIP APPLIER
Type: IMPLANTABLE DEVICE | Site: ABDOMEN | Status: FUNCTIONAL
Brand: LIGAMAX

## 2021-09-25 RX ORDER — HYDRALAZINE HYDROCHLORIDE 20 MG/ML
5 INJECTION INTRAMUSCULAR; INTRAVENOUS
Status: DISCONTINUED | OUTPATIENT
Start: 2021-09-25 | End: 2021-09-25 | Stop reason: HOSPADM

## 2021-09-25 RX ORDER — NALOXONE HCL 0.4 MG/ML
0.2 VIAL (ML) INJECTION AS NEEDED
Status: DISCONTINUED | OUTPATIENT
Start: 2021-09-25 | End: 2021-09-25 | Stop reason: HOSPADM

## 2021-09-25 RX ORDER — KETOROLAC TROMETHAMINE 30 MG/ML
INJECTION, SOLUTION INTRAMUSCULAR; INTRAVENOUS AS NEEDED
Status: DISCONTINUED | OUTPATIENT
Start: 2021-09-25 | End: 2021-09-25 | Stop reason: SURG

## 2021-09-25 RX ORDER — PROMETHAZINE HYDROCHLORIDE 25 MG/1
25 TABLET ORAL ONCE AS NEEDED
Status: DISCONTINUED | OUTPATIENT
Start: 2021-09-25 | End: 2021-09-25 | Stop reason: HOSPADM

## 2021-09-25 RX ORDER — FLUMAZENIL 0.1 MG/ML
0.2 INJECTION INTRAVENOUS AS NEEDED
Status: DISCONTINUED | OUTPATIENT
Start: 2021-09-25 | End: 2021-09-25 | Stop reason: HOSPADM

## 2021-09-25 RX ORDER — CEFAZOLIN SODIUM 2 G/100ML
2 INJECTION, SOLUTION INTRAVENOUS ONCE
Status: COMPLETED | OUTPATIENT
Start: 2021-09-25 | End: 2021-09-25

## 2021-09-25 RX ORDER — DIPHENHYDRAMINE HYDROCHLORIDE 50 MG/ML
12.5 INJECTION INTRAMUSCULAR; INTRAVENOUS
Status: DISCONTINUED | OUTPATIENT
Start: 2021-09-25 | End: 2021-09-25 | Stop reason: HOSPADM

## 2021-09-25 RX ORDER — LIDOCAINE HYDROCHLORIDE 10 MG/ML
0.5 INJECTION, SOLUTION EPIDURAL; INFILTRATION; INTRACAUDAL; PERINEURAL ONCE AS NEEDED
Status: DISCONTINUED | OUTPATIENT
Start: 2021-09-25 | End: 2021-09-25 | Stop reason: HOSPADM

## 2021-09-25 RX ORDER — MAGNESIUM HYDROXIDE 1200 MG/15ML
LIQUID ORAL AS NEEDED
Status: DISCONTINUED | OUTPATIENT
Start: 2021-09-25 | End: 2021-09-25 | Stop reason: HOSPADM

## 2021-09-25 RX ORDER — MORPHINE SULFATE 2 MG/ML
4 INJECTION, SOLUTION INTRAMUSCULAR; INTRAVENOUS ONCE
Status: COMPLETED | OUTPATIENT
Start: 2021-09-25 | End: 2021-09-25

## 2021-09-25 RX ORDER — HYDROMORPHONE HYDROCHLORIDE 1 MG/ML
0.5 INJECTION, SOLUTION INTRAMUSCULAR; INTRAVENOUS; SUBCUTANEOUS
Status: DISCONTINUED | OUTPATIENT
Start: 2021-09-25 | End: 2021-09-25 | Stop reason: HOSPADM

## 2021-09-25 RX ORDER — ONDANSETRON 2 MG/ML
INJECTION INTRAMUSCULAR; INTRAVENOUS AS NEEDED
Status: DISCONTINUED | OUTPATIENT
Start: 2021-09-25 | End: 2021-09-25 | Stop reason: SURG

## 2021-09-25 RX ORDER — ONDANSETRON 2 MG/ML
4 INJECTION INTRAMUSCULAR; INTRAVENOUS ONCE
Status: COMPLETED | OUTPATIENT
Start: 2021-09-25 | End: 2021-09-25

## 2021-09-25 RX ORDER — FENTANYL CITRATE 50 UG/ML
INJECTION, SOLUTION INTRAMUSCULAR; INTRAVENOUS AS NEEDED
Status: DISCONTINUED | OUTPATIENT
Start: 2021-09-25 | End: 2021-09-25 | Stop reason: SURG

## 2021-09-25 RX ORDER — GLYCOPYRROLATE 0.2 MG/ML
INJECTION INTRAMUSCULAR; INTRAVENOUS AS NEEDED
Status: DISCONTINUED | OUTPATIENT
Start: 2021-09-25 | End: 2021-09-25 | Stop reason: SURG

## 2021-09-25 RX ORDER — ONDANSETRON 2 MG/ML
4 INJECTION INTRAMUSCULAR; INTRAVENOUS EVERY 6 HOURS PRN
Status: DISCONTINUED | OUTPATIENT
Start: 2021-09-25 | End: 2021-09-26 | Stop reason: HOSPADM

## 2021-09-25 RX ORDER — PROPOFOL 10 MG/ML
VIAL (ML) INTRAVENOUS AS NEEDED
Status: DISCONTINUED | OUTPATIENT
Start: 2021-09-25 | End: 2021-09-25 | Stop reason: SURG

## 2021-09-25 RX ORDER — BUPIVACAINE HYDROCHLORIDE 2.5 MG/ML
INJECTION, SOLUTION EPIDURAL; INFILTRATION; INTRACAUDAL AS NEEDED
Status: DISCONTINUED | OUTPATIENT
Start: 2021-09-25 | End: 2021-09-25 | Stop reason: HOSPADM

## 2021-09-25 RX ORDER — NEOSTIGMINE METHYLSULFATE 0.5 MG/ML
INJECTION, SOLUTION INTRAVENOUS AS NEEDED
Status: DISCONTINUED | OUTPATIENT
Start: 2021-09-25 | End: 2021-09-25 | Stop reason: SURG

## 2021-09-25 RX ORDER — ONDANSETRON 2 MG/ML
4 INJECTION INTRAMUSCULAR; INTRAVENOUS ONCE AS NEEDED
Status: DISCONTINUED | OUTPATIENT
Start: 2021-09-25 | End: 2021-09-25 | Stop reason: HOSPADM

## 2021-09-25 RX ORDER — HYDROMORPHONE HYDROCHLORIDE 1 MG/ML
0.25 INJECTION, SOLUTION INTRAMUSCULAR; INTRAVENOUS; SUBCUTANEOUS EVERY 4 HOURS PRN
Status: DISCONTINUED | OUTPATIENT
Start: 2021-09-25 | End: 2021-09-26 | Stop reason: HOSPADM

## 2021-09-25 RX ORDER — MIDAZOLAM HYDROCHLORIDE 1 MG/ML
1 INJECTION INTRAMUSCULAR; INTRAVENOUS
Status: DISCONTINUED | OUTPATIENT
Start: 2021-09-25 | End: 2021-09-25 | Stop reason: HOSPADM

## 2021-09-25 RX ORDER — HYDROMORPHONE HCL 110MG/55ML
PATIENT CONTROLLED ANALGESIA SYRINGE INTRAVENOUS AS NEEDED
Status: DISCONTINUED | OUTPATIENT
Start: 2021-09-25 | End: 2021-09-25 | Stop reason: SURG

## 2021-09-25 RX ORDER — KETOROLAC TROMETHAMINE 30 MG/ML
30 INJECTION, SOLUTION INTRAMUSCULAR; INTRAVENOUS EVERY 8 HOURS
Status: DISCONTINUED | OUTPATIENT
Start: 2021-09-25 | End: 2021-09-26 | Stop reason: HOSPADM

## 2021-09-25 RX ORDER — HYDROCODONE BITARTRATE AND ACETAMINOPHEN 7.5; 325 MG/1; MG/1
1 TABLET ORAL EVERY 6 HOURS PRN
Status: DISCONTINUED | OUTPATIENT
Start: 2021-09-25 | End: 2021-09-26 | Stop reason: HOSPADM

## 2021-09-25 RX ORDER — DEXAMETHASONE SODIUM PHOSPHATE 10 MG/ML
INJECTION INTRAMUSCULAR; INTRAVENOUS AS NEEDED
Status: DISCONTINUED | OUTPATIENT
Start: 2021-09-25 | End: 2021-09-25 | Stop reason: SURG

## 2021-09-25 RX ORDER — FENTANYL CITRATE 50 UG/ML
50 INJECTION, SOLUTION INTRAMUSCULAR; INTRAVENOUS
Status: DISCONTINUED | OUTPATIENT
Start: 2021-09-25 | End: 2021-09-25 | Stop reason: HOSPADM

## 2021-09-25 RX ORDER — LIDOCAINE HYDROCHLORIDE 20 MG/ML
INJECTION, SOLUTION INFILTRATION; PERINEURAL AS NEEDED
Status: DISCONTINUED | OUTPATIENT
Start: 2021-09-25 | End: 2021-09-25 | Stop reason: SURG

## 2021-09-25 RX ORDER — PROMETHAZINE HYDROCHLORIDE 25 MG/1
25 SUPPOSITORY RECTAL ONCE AS NEEDED
Status: DISCONTINUED | OUTPATIENT
Start: 2021-09-25 | End: 2021-09-25 | Stop reason: HOSPADM

## 2021-09-25 RX ORDER — SODIUM CHLORIDE 0.9 % (FLUSH) 0.9 %
3-10 SYRINGE (ML) INJECTION AS NEEDED
Status: DISCONTINUED | OUTPATIENT
Start: 2021-09-25 | End: 2021-09-25 | Stop reason: HOSPADM

## 2021-09-25 RX ORDER — ROCURONIUM BROMIDE 10 MG/ML
INJECTION, SOLUTION INTRAVENOUS AS NEEDED
Status: DISCONTINUED | OUTPATIENT
Start: 2021-09-25 | End: 2021-09-25 | Stop reason: SURG

## 2021-09-25 RX ORDER — EPHEDRINE SULFATE 50 MG/ML
5 INJECTION, SOLUTION INTRAVENOUS ONCE AS NEEDED
Status: DISCONTINUED | OUTPATIENT
Start: 2021-09-25 | End: 2021-09-25 | Stop reason: HOSPADM

## 2021-09-25 RX ORDER — LABETALOL HYDROCHLORIDE 5 MG/ML
5 INJECTION, SOLUTION INTRAVENOUS
Status: DISCONTINUED | OUTPATIENT
Start: 2021-09-25 | End: 2021-09-25 | Stop reason: HOSPADM

## 2021-09-25 RX ORDER — SODIUM CHLORIDE, SODIUM LACTATE, POTASSIUM CHLORIDE, CALCIUM CHLORIDE 600; 310; 30; 20 MG/100ML; MG/100ML; MG/100ML; MG/100ML
9 INJECTION, SOLUTION INTRAVENOUS CONTINUOUS
Status: DISCONTINUED | OUTPATIENT
Start: 2021-09-25 | End: 2021-09-25

## 2021-09-25 RX ORDER — DIPHENHYDRAMINE HCL 25 MG
25 CAPSULE ORAL
Status: DISCONTINUED | OUTPATIENT
Start: 2021-09-25 | End: 2021-09-25 | Stop reason: HOSPADM

## 2021-09-25 RX ORDER — SODIUM CHLORIDE 9 MG/ML
INJECTION, SOLUTION INTRAVENOUS AS NEEDED
Status: DISCONTINUED | OUTPATIENT
Start: 2021-09-25 | End: 2021-09-25 | Stop reason: HOSPADM

## 2021-09-25 RX ORDER — FAMOTIDINE 10 MG/ML
20 INJECTION, SOLUTION INTRAVENOUS ONCE
Status: COMPLETED | OUTPATIENT
Start: 2021-09-25 | End: 2021-09-25

## 2021-09-25 RX ORDER — SODIUM CHLORIDE 0.9 % (FLUSH) 0.9 %
3 SYRINGE (ML) INJECTION EVERY 12 HOURS SCHEDULED
Status: DISCONTINUED | OUTPATIENT
Start: 2021-09-25 | End: 2021-09-25 | Stop reason: HOSPADM

## 2021-09-25 RX ADMIN — PROPOFOL 200 MG: 10 INJECTION, EMULSION INTRAVENOUS at 16:32

## 2021-09-25 RX ADMIN — MIDAZOLAM 1 MG: 1 INJECTION INTRAMUSCULAR; INTRAVENOUS at 16:23

## 2021-09-25 RX ADMIN — KETOROLAC TROMETHAMINE 30 MG: 30 INJECTION, SOLUTION INTRAMUSCULAR at 17:26

## 2021-09-25 RX ADMIN — CEFAZOLIN SODIUM 2 G: 2 INJECTION, SOLUTION INTRAVENOUS at 16:23

## 2021-09-25 RX ADMIN — FAMOTIDINE 20 MG: 10 INJECTION INTRAVENOUS at 16:24

## 2021-09-25 RX ADMIN — FENTANYL CITRATE 50 MCG: 50 INJECTION INTRAMUSCULAR; INTRAVENOUS at 16:23

## 2021-09-25 RX ADMIN — ONDANSETRON 4 MG: 2 INJECTION INTRAMUSCULAR; INTRAVENOUS at 20:46

## 2021-09-25 RX ADMIN — FENTANYL CITRATE 100 MCG: 0.05 INJECTION, SOLUTION INTRAMUSCULAR; INTRAVENOUS at 16:27

## 2021-09-25 RX ADMIN — KETOROLAC TROMETHAMINE 30 MG: 30 INJECTION, SOLUTION INTRAMUSCULAR at 17:36

## 2021-09-25 RX ADMIN — GLYCOPYRROLATE 0.4 MG: 0.2 INJECTION INTRAMUSCULAR; INTRAVENOUS at 17:08

## 2021-09-25 RX ADMIN — ONDANSETRON 4 MG: 2 INJECTION INTRAMUSCULAR; INTRAVENOUS at 17:06

## 2021-09-25 RX ADMIN — HYDROMORPHONE HYDROCHLORIDE 0.5 MG: 2 INJECTION, SOLUTION INTRAMUSCULAR; INTRAVENOUS; SUBCUTANEOUS at 17:26

## 2021-09-25 RX ADMIN — ROCURONIUM BROMIDE 40 MG: 50 INJECTION INTRAVENOUS at 16:32

## 2021-09-25 RX ADMIN — HYDROCODONE BITARTRATE AND ACETAMINOPHEN 1 TABLET: 7.5; 325 TABLET ORAL at 18:57

## 2021-09-25 RX ADMIN — ONDANSETRON 4 MG: 2 INJECTION INTRAMUSCULAR; INTRAVENOUS at 10:03

## 2021-09-25 RX ADMIN — ONDANSETRON 4 MG: 2 INJECTION INTRAMUSCULAR; INTRAVENOUS at 14:18

## 2021-09-25 RX ADMIN — DEXAMETHASONE SODIUM PHOSPHATE 8 MG: 10 INJECTION INTRAMUSCULAR; INTRAVENOUS at 16:37

## 2021-09-25 RX ADMIN — LIDOCAINE HYDROCHLORIDE 100 MG: 20 INJECTION, SOLUTION INFILTRATION; PERINEURAL at 16:32

## 2021-09-25 RX ADMIN — IOPAMIDOL 95 ML: 755 INJECTION, SOLUTION INTRAVENOUS at 11:04

## 2021-09-25 RX ADMIN — NEOSTIGMINE METHYLSULFATE 4 MG: 0.5 INJECTION INTRAVENOUS at 17:08

## 2021-09-25 RX ADMIN — SODIUM CHLORIDE, POTASSIUM CHLORIDE, SODIUM LACTATE AND CALCIUM CHLORIDE 9 ML/HR: 600; 310; 30; 20 INJECTION, SOLUTION INTRAVENOUS at 16:23

## 2021-09-25 RX ADMIN — HYDROMORPHONE HYDROCHLORIDE 0.25 MG: 1 INJECTION, SOLUTION INTRAMUSCULAR; INTRAVENOUS; SUBCUTANEOUS at 20:50

## 2021-09-25 RX ADMIN — PHENYLEPHRINE HYDROCHLORIDE 100 MCG: 10 INJECTION INTRAVENOUS at 16:52

## 2021-09-25 RX ADMIN — MORPHINE SULFATE 4 MG: 2 INJECTION, SOLUTION INTRAMUSCULAR; INTRAVENOUS at 10:03

## 2021-09-25 NOTE — ANESTHESIA PROCEDURE NOTES
Airway  Urgency: elective    Date/Time: 9/25/2021 4:33 PM  Airway not difficult    General Information and Staff    Patient location during procedure: OR  Anesthesiologist: Sergey Moreno DO  CRNA: Marianela Cm CRNA    Indications and Patient Condition  Indications for airway management: airway protection    Preoxygenated: yes  MILS not maintained throughout  Mask difficulty assessment: 1 - vent by mask    Final Airway Details  Final airway type: endotracheal airway      Successful airway: ETT  Cuffed: yes   Successful intubation technique: direct laryngoscopy  Facilitating devices/methods: anterior pressure/BURP  Endotracheal tube insertion site: oral  Blade: Steve  Blade size: 3  ETT size (mm): 7.0  Cormack-Lehane Classification: grade I - full view of glottis  Placement verified by: chest auscultation and capnometry   Cuff volume (mL): 7  Measured from: lips  ETT/EBT  to lips (cm): 21  Number of attempts at approach: 1  Assessment: lips, teeth, and gum same as pre-op and atraumatic intubation    Additional Comments  Pt preoxygenated prior to induction, easy mask airway, atraumatic intubation,+ ETCO2, + bs bilat,  ETT secured and connected to ventilator.

## 2021-09-25 NOTE — ED NOTES
Nursing report ED to floor  Mary Jensen  51 y.o.  female    HPI (triage note):   Chief Complaint   Patient presents with   • Abdominal Pain   • Back Pain       Admitting doctor:   Leno Segura MD    Admitting diagnosis:   The primary encounter diagnosis was Calculus of gallbladder with cholecystitis without biliary obstruction, unspecified cholecystitis acuity. Diagnoses of Biliary colic and Epigastric abdominal pain were also pertinent to this visit.    Code status:   Current Code Status     Date Active Code Status Order ID Comments User Context       Not on file    Advance Care Planning Activity          Allergies:   Patient has no known allergies.    Weight:       09/25/21  0932   Weight: 53.5 kg (118 lb)       Most recent vitals:   Vitals:    09/25/21 0933 09/25/21 1159 09/25/21 1215 09/25/21 1418   BP: 112/77   114/71   Pulse:  74 72 60   Resp:    18   Temp:       TempSrc:       SpO2:  97% 99% 99%   Weight:       Height:           Active LDAs/IV Access:   Lines, Drains & Airways    Active LDAs     Name:   Placement date:   Placement time:   Site:   Days:    Peripheral IV 09/25/21 1001 Right Antecubital   09/25/21    1001    Antecubital   less than 1                Labs (abnormal labs have a star):   Labs Reviewed   D-DIMER, QUANTITATIVE - Abnormal; Notable for the following components:       Result Value    D-Dimer, Quantitative 0.54 (*)     All other components within normal limits    Narrative:     The Stago D-Dimer test used in conjunction with a clinical pretest probability (PTP) assessment model, has been approved by the FDA to rule out the presence of venous thromboembolism (VTE) in outpatients suspected of deep venous thrombosis (DVT) or pulmonary embolism (PE). The cut-off for negative predictive value is <0.50 MCGFEU/mL.   TROPONIN (IN-HOUSE) - Normal    Narrative:     Troponin T Reference Range:  <= 0.03 ng/mL-   Negative for AMI  >0.03 ng/mL-     Abnormal for myocardial necrosis.  Clinicians  would have to utilize clinical acumen, EKG, Troponin and serial changes to determine if it is an Acute Myocardial Infarction or myocardial injury due to an underlying chronic condition.       Results may be falsely decreased if patient taking Biotin.     LIPASE - Normal   CBC WITH AUTO DIFFERENTIAL - Normal   URINALYSIS W/ MICROSCOPIC IF INDICATED (NO CULTURE) - Normal    Narrative:     Urine microscopic not indicated.   COVID PRE-OP / PRE-PROCEDURE SCREENING ORDER (NO ISOLATION)    Narrative:     The following orders were created for panel order COVID PRE-OP / PRE-PROCEDURE SCREENING ORDER (NO ISOLATION) - Swab, Nasopharynx.  Procedure                               Abnormality         Status                     ---------                               -----------         ------                     COVID-19,APTIMA PANTHER(...[[id number]]                                                   Please view results for these tests on the individual orders.   COVID-19,APTIMA PANTHER ([facility] ,NP/OP SWAB IN UTM/VTM/SALINE TRANSPORT MEDIA,24 HR TAT   COVID-19,[facility] IN-HOUSE CEPHEID/GINNY, NP SWAB IN TRANSPORT MEDIA 8-12 HR TAT   COMPREHENSIVE METABOLIC PANEL    Narrative:     GFR Normal >60  Chronic Kidney Disease <60  Kidney Failure <15     CBC AND DIFFERENTIAL    Narrative:     The following orders were created for panel order CBC & Differential.  Procedure                               Abnormality         Status                     ---------                               -----------         ------                     CBC Auto Differential[[id number]]        Normal              Final result                 Please view results for these tests on the individual orders.       EKG:   ECG 12 Lead   Preliminary Result   HEART RATE= 70  bpm   RR Interval= 852  ms   MO Interval= 136  ms   P Horizontal Axis= 11  deg   P Front Axis= 38  deg   QRSD Interval= 78  ms   QT Interval= 378  ms   QRS Axis= 81  deg   T Wave Axis= 34  deg   -  NORMAL ECG -   Sinus rhythm   Electronically Signed By:    Date and Time of Study: 2021-09-25 09:52:26          Meds given in ED:   Medications   morphine injection 4 mg (4 mg Intravenous Given 9/25/21 1003)   ondansetron (ZOFRAN) injection 4 mg (4 mg Intravenous Given 9/25/21 1003)   iopamidol (ISOVUE-370) 76 % injection 100 mL (95 mL Intravenous Given by Other 9/25/21 1104)   ondansetron (ZOFRAN) injection 4 mg (4 mg Intravenous Given 9/25/21 1418)       Imaging results:  US Gallbladder    Result Date: 9/25/2021   Cholelithiasis. Some areas of the gallbladder wall appears slightly thickened, but no gallbladder tenderness or pericholecystic fluid was noted. If further imaging evaluation is indicated, hepatobiliary scintigraphy could be obtained.  This report was finalized on 9/25/2021 2:21 PM by Dr. Velasquez Resendiz M.D.      XR Chest 1 View    Result Date: 9/25/2021  No evidence for acute pulmonary process. Follow-up as clinical indications persist.  This report was finalized on 9/25/2021 10:17 AM by Dr. Velasquez Resendiz M.D.        Ambulatory status:   -Up ad joseph    Social issues:   Social History     Socioeconomic History   • Marital status:      Spouse name: Not on file   • Number of children: Not on file   • Years of education: Not on file   • Highest education level: Not on file    Nursing report ED to floor       Jeri Lei RN  09/25/21 4881

## 2021-09-25 NOTE — ED PROVIDER NOTES
10:35 EDT  Patient seen and examined with physician assistant.  Briefly patient presents for evaluation of chest and abdominal pain radiating to her back.  States symptoms started about a month ago.  States that has been on steroids as well as antibiotics recently.  Patient  was seen here 3 days ago and had CT scan is negative.  Patient has had no vomiting or diarrhea.  Has had pain that radiates to her back.  Possible chest pain            On exam patient is alert cooperative in no distress.  Patient's heart is regular rate and rhythm and lungs are clear bilaterally.  Patient's abdomen soft and tender epigastric    EKG          EKG time: 952  Rhythm/Rate: Normal sinus rhythm 70  P waves and RI: Normal P waves  QRS, axis: Normal QRS  ST and T waves: Normal ST-T waves    Interpreted Contemporaneously by me, independently viewed  No prior        Plan will be lab evaluation CT chest abdomen        MD ATTESTATION NOTE    The KAMILAH and I have discussed this patient's history, physical exam, and treatment plan.  I have reviewed the documentation and personally had a face to face interaction with the patient. I affirm the documentation and agree with the treatment and plan.  The attached note describes my personal findings.      Patient was wearing a face mask when I entered the room and they continued to wear a mask throughout their stay in the ED.  I wore PPE, including  gloves, face mask with shield or face mask with goggles whenever I was in the room with patient.      Tamir Stephens MD  09/25/21 8910       Tamir Setphens MD  09/25/21 3691

## 2021-09-25 NOTE — ANESTHESIA POSTPROCEDURE EVALUATION
"Patient: Mary Jensen    Procedure Summary     Date: 09/25/21 Room / Location: [facility] OR 06 / [facility] MAIN OR    Anesthesia Start: 1626 Anesthesia Stop: 1730    Procedure: Laparoscopic cholecystectomy (N/A Abdomen) Diagnosis:       Calculus of gallbladder with cholecystitis without biliary obstruction, unspecified cholecystitis acuity      (Calculus of gallbladder with cholecystitis without biliary obstruction, unspecified cholecystitis acuity [K80.10])    Surgeons: Leno Segura MD Provider: Sergey Moreno DO    Anesthesia Type: general ASA Status: 2 - Emergent          Anesthesia Type: general    Vitals  Vitals Value Taken Time   /75 09/25/21 1746   Temp 36.6 °C (97.8 °F) 09/25/21 1728   Pulse 51 09/25/21 1759   Resp 14 09/25/21 1745   SpO2 100 % 09/25/21 1759   Vitals shown include unvalidated device data.        Post Anesthesia Care and Evaluation    Patient location during evaluation: bedside  Patient participation: complete - patient participated  Level of consciousness: awake and alert  Pain management: adequate  Airway patency: patent  Anesthetic complications: No anesthetic complications  PONV Status: none  Cardiovascular status: acceptable and hemodynamically stable  Respiratory status: acceptable, spontaneous ventilation and nonlabored ventilation  Hydration status: acceptable    Comments: /75   Pulse 50   Temp 36.6 °C (97.8 °F) (Oral)   Resp 14   Ht 162.6 cm (64\")   Wt 53.5 kg (118 lb)   LMP  (LMP Unknown)   SpO2 100%   BMI 20.25 kg/m²         "

## 2021-09-25 NOTE — ED PROVIDER NOTES
EMERGENCY DEPARTMENT ENCOUNTER    Room Number:  08/08  Date seen:  9/25/2021  Time seen: 09:27 EDT  PCP: Provider, No Known  Historian: Patient  HPI:  Chief complaint: Chest pain  A complete HPI/ROS/PMH/PSH/SH/FH are unobtainable due to:   Context:Mary Jensen is a 51 y.o. female, hx of GERD, who presents to the ED with c/o intermittent chest pain that radiates to her epigastric abdomen and bilateral lower back for 1 month.  It started to get worse 1 week ago and has been constant for 7 days.  Associated symptoms include nausea.  She describes the pain as sharp and severe and rates it a 10 out of 10 pain scale.  Patient was seen here 3 days ago with unremarkable CT abdomen results.  She also reports that she has a hard time catching her breath due to the pain.  Patient also reports that she has had a few near syncopal episode, denies loss of consciousness.  Patient denies any personal or family history of coronary artery disease.    Patient was placed in face mask in first look. Patient was wearing facemask when I entered the room and throughout our encounter. I wore full protective equipment throughout this patient encounter including a N95 face mask, goggles, and gloves. Hand hygiene was performed before donning protective equipment and after removal when leaving the room.      MEDICAL RECORD REVIEW  Patient was seen here on 9/22/2021 for same symptoms.  Negative CT abdomen pelvis with contrast at that time.    ALLERGIES  Patient has no known allergies.    PAST MEDICAL HISTORY  Active Ambulatory Problems     Diagnosis Date Noted   • No Active Ambulatory Problems     Resolved Ambulatory Problems     Diagnosis Date Noted   • No Resolved Ambulatory Problems     No Additional Past Medical History       PAST SURGICAL HISTORY  No past surgical history on file.    FAMILY HISTORY  No family history on file.    SOCIAL HISTORY  Social History     Socioeconomic History   • Marital status:      Spouse name: Not on file    • Number of children: Not on file   • Years of education: Not on file   • Highest education level: Not on file       REVIEW OF SYSTEMS  Review of Systems    All systems reviewed and negative except for those discussed in HPI.     PHYSICAL EXAM    ED Triage Vitals [09/25/21 0921]   Temp Heart Rate Resp BP SpO2   97.7 °F (36.5 °C) (!) 142 18 -- 98 %      Temp src Heart Rate Source Patient Position BP Location FiO2 (%)   Tympanic -- -- -- --     Physical Exam    I have reviewed the triage vital signs and nursing notes.      GENERAL: not distressed; appears uncomfortable  HENT: nares patent  EYES: no scleral icterus  NECK: no ROM limitations  CV: regular rhythm, regular rate  RESPIRATORY: normal effort, clear to auscultation bilaterally  ABDOMEN: soft; epigastric tenderness to palpation with voluntary guarding, no rebound  : deferred  MUSCULOSKELETAL: no deformity  NEURO: alert, moves all extremities, follows commands  SKIN: warm, dry    LAB RESULTS  Recent Results (from the past 24 hour(s))   ECG 12 Lead    Collection Time: 09/25/21  9:52 AM   Result Value Ref Range    QT Interval 378 ms   Comprehensive Metabolic Panel    Collection Time: 09/25/21 10:02 AM    Specimen: Blood   Result Value Ref Range    Glucose 91 65 - 99 mg/dL    BUN 9 6 - 20 mg/dL    Creatinine 0.76 0.57 - 1.00 mg/dL    Sodium 140 136 - 145 mmol/L    Potassium 3.7 3.5 - 5.2 mmol/L    Chloride 105 98 - 107 mmol/L    CO2 22.3 22.0 - 29.0 mmol/L    Calcium 9.9 8.6 - 10.5 mg/dL    Total Protein 7.9 6.0 - 8.5 g/dL    Albumin 4.50 3.50 - 5.20 g/dL    ALT (SGPT) 13 1 - 33 U/L    AST (SGOT) 17 1 - 32 U/L    Alkaline Phosphatase 93 39 - 117 U/L    Total Bilirubin 0.3 0.0 - 1.2 mg/dL    eGFR Non African Amer 80 >60 mL/min/1.73    Globulin 3.4 gm/dL    A/G Ratio 1.3 g/dL    BUN/Creatinine Ratio 11.8 7.0 - 25.0    Anion Gap 12.7 5.0 - 15.0 mmol/L   Troponin    Collection Time: 09/25/21 10:02 AM    Specimen: Blood   Result Value Ref Range    Troponin T <0.010  0.000 - 0.030 ng/mL   Lipase    Collection Time: 09/25/21 10:02 AM    Specimen: Blood   Result Value Ref Range    Lipase 45 13 - 60 U/L   D-dimer, Quantitative    Collection Time: 09/25/21 10:02 AM    Specimen: Blood   Result Value Ref Range    D-Dimer, Quantitative 0.54 (H) 0.00 - 0.49 MCGFEU/mL   CBC Auto Differential    Collection Time: 09/25/21 10:02 AM    Specimen: Blood   Result Value Ref Range    WBC 4.66 3.40 - 10.80 10*3/mm3    RBC 4.36 3.77 - 5.28 10*6/mm3    Hemoglobin 13.6 12.0 - 15.9 g/dL    Hematocrit 40.7 34.0 - 46.6 %    MCV 93.3 79.0 - 97.0 fL    MCH 31.2 26.6 - 33.0 pg    MCHC 33.4 31.5 - 35.7 g/dL    RDW 12.6 12.3 - 15.4 %    RDW-SD 43.7 37.0 - 54.0 fl    MPV 8.8 6.0 - 12.0 fL    Platelets 313 140 - 450 10*3/mm3    Neutrophil % 60.3 42.7 - 76.0 %    Lymphocyte % 30.9 19.6 - 45.3 %    Monocyte % 7.3 5.0 - 12.0 %    Eosinophil % 1.3 0.3 - 6.2 %    Basophil % 0.2 0.0 - 1.5 %    Immature Grans % 0.0 0.0 - 0.5 %    Neutrophils, Absolute 2.81 1.70 - 7.00 10*3/mm3    Lymphocytes, Absolute 1.44 0.70 - 3.10 10*3/mm3    Monocytes, Absolute 0.34 0.10 - 0.90 10*3/mm3    Eosinophils, Absolute 0.06 0.00 - 0.40 10*3/mm3    Basophils, Absolute 0.01 0.00 - 0.20 10*3/mm3    Immature Grans, Absolute 0.00 0.00 - 0.05 10*3/mm3    nRBC 0.0 0.0 - 0.2 /100 WBC   Urinalysis With Microscopic If Indicated (No Culture) - Urine, Clean Catch    Collection Time: 09/25/21 11:20 AM    Specimen: Urine, Clean Catch   Result Value Ref Range    Color, UA Yellow Yellow, Straw    Appearance, UA Clear Clear    pH, UA 8.0 5.0 - 8.0    Specific Gravity, UA >=1.030 1.005 - 1.030    Glucose, UA Negative Negative    Ketones, UA Negative Negative    Bilirubin, UA Negative Negative    Blood, UA Negative Negative    Protein, UA Negative Negative    Leuk Esterase, UA Negative Negative    Nitrite, UA Negative Negative    Urobilinogen, UA 0.2 E.U./dL 0.2 - 1.0 E.U./dL         RADIOLOGY RESULTS  US Gallbladder   Final Result       Cholelithiasis.  Some areas of the gallbladder wall appears slightly   thickened, but no gallbladder tenderness or pericholecystic fluid was   noted. If further imaging evaluation is indicated, hepatobiliary   scintigraphy could be obtained.       This report was finalized on 9/25/2021 2:21 PM by Dr. Velasquez Resendiz M.D.          CT Angiogram Chest   Final Result      CT Abdomen Pelvis With Contrast   Final Result      XR Chest 1 View   Final Result   No evidence for acute pulmonary process. Follow-up as   clinical indications persist.       This report was finalized on 9/25/2021 10:17 AM by Dr. Velasquez Resendiz M.D.                PROGRESS, DATA ANALYSIS, CONSULTS AND MEDICAL DECISION MAKING  All labs have been independently reviewed by me.  All radiology studies have been reviewed by me and discussed with radiologist dictating the report. Discussion below represents my analysis of pertinent findings related to patient's condition, differential diagnosis, treatment plan and final disposition.     ED Course as of Sep 25 1509   Sat Sep 25, 2021   1217 I rechecked patient and informed her of the imaging and lab results.  I have ordered an ultrasound for further evaluation and possible mild cholecystitis.  She reports feeling significantly better after the medication given here in the emergency room.  We will continue to monitor.    [SS]   1415 I discussed with ultrasound tech regarding the ultrasound results.  There is a 1.5 cm gallstone and some focal gallbladder wall thickening approximately 4 mm.  No pericholecystic fluid.    [SS]   1457 I discussed with Dr. Segura, general surgeon regarding patient.  He agrees to admit patient and all questions addressed at this time.    [SS]   1507 I rechecked patient and had a lengthy discussion with her regarding her admission to the hospital and for her upcoming cholecystectomy. All questions addressed at this time.    [SS]      ED Course User Index  [SS] Estella Herrera PA-C  "      The differential diagnosis include but are not limited to aortic dissection, gastritis, cholecystitis.         Reviewed pt's history and workup with Dr. Stephens.  After a bedside evaluation, Dr. Stephens agrees with the plan of care.        (FOR ADMIT) Based on the patient's lab findings and presenting symptoms, the doctor and I feel it is appropriate to admit the patient for further management, evaluation, and treatment.  I have discussed this with the admitting team.  I have also discussed this with the patient/family.  They are in agreement with admission.          Disposition vitals:  /71   Pulse 60   Temp 97.7 °F (36.5 °C) (Tympanic)   Resp 18   Ht 162.6 cm (64\")   Wt 53.5 kg (118 lb)   SpO2 99%   BMI 20.25 kg/m²       DIAGNOSIS  Final diagnoses:   Biliary colic   Calculus of gallbladder with cholecystitis without biliary obstruction, unspecified cholecystitis acuity   Epigastric abdominal pain       FOLLOW UP   No follow-up provider specified.       Estella Herrera PA-C  09/25/21 1509    "

## 2021-09-25 NOTE — H&P
Cc: Abdominal pain, nausea    History of presenting illness:   This is a quite nice, generally healthy 51-year-old female who presented to the emergency department earlier today with severe epigastric abdominal pain and pain up into the chest as well as radiating through into her back.  It was associated with nausea.  She has had several attacks of pain which have been quite severe over the last week, but has had about a month overall of abdominal discomfort.  She was in the emergency department of couple of days ago and no finding was identified at that time.  She now presents and additional work-up as suggested cholecystitis.    Past Medical History: Denies    Past Surgical History: Negative    Medications: None chronically, although recently she has been on Carafate, proton pump inhibitor and Zofran for symptomatic management    Allergies: None known    Social History: Non-smoker, she is active    Family History: Negative for known colon and rectal cancer, her mother did suffer from gallbladder disease    Review of Systems:  Constitutional: Positive for decreased appetite, negative for fever or chills  Neck: no swollen glands or dysphagia or odynophagia  Respiratory: negative for SOB, cough, hemoptysis or wheezing  Cardiovascular: negative for chest pain, palpitations or peripheral edema  Gastrointestinal: Positive for abdominal pain nausea, bloating      Physical Exam:  BMI: 20.3  Temperature 97.7, heart rate was 142 at admission, improved to 60 after some narcotics and fluid resuscitation, blood pressure 114/71  General: alert and oriented, appropriate, appears mildly anxious  Eyes: No scleral icterus, extraocular movements are intact  Neck: Supple without lymphadenopathy or thyromegaly, trachea is in the midline  Respiratory: There is good bilateral chest expansion, no use of accessory muscles is noted  Cardiovascular: No jugular venous distention or peripheral edema is seen  Gastrointestinal: Soft and  nondistended.  There is epigastric tenderness without true guarding.  There is no hernia felt.    Laboratory data: White blood cell count normal at 4.6, hemoglobin is 13.6, chemistries are normal, as is total bilirubin.  Lipase is normal.  Covid screen is pending.    Imaging data: CT abdomen pelvis reviewed by me and largely unremarkable.  Perhaps some mild wall thickening of the gallbladder could be construed.  Gallbladder ultrasound reviewed and demonstrates evidence of stones and mild wall thickening at about 4 mm.      Assessment and plan:   -Sustained biliary colic versus acute cholecystitis with cholelithiasis  -Options discussed with patient.  I have recommended proceeding with laparoscopic cholecystectomy.    Risks associated with the procedure are noted to include, but not be limited to, bleeding, infection, injury to intra-abdominal structures including the liver, small and large intestine, stomach, duodenum, common bile duct and major vascular structures.  Possible need for conversion to open surgery, further revisional surgery, postoperative ERCP discussed.  Possible postcholecystectomy syndrome also discussed with patient.      Leno Segura MD, FACS  General, Minimally Invasive and Endoscopic Surgery  [facility]    [address] [place] [address], [address]  [place], [place], [address]  P: [phone]  F: [phone]

## 2021-09-25 NOTE — ANESTHESIA PREPROCEDURE EVALUATION
Anesthesia Evaluation     Patient summary reviewed   no history of anesthetic complications:  NPO Solid Status: > 8 hours  NPO Liquid Status: > 2 hours           Airway   Mallampati: II  TM distance: >3 FB  Neck ROM: full  No difficulty expected  Dental - normal exam     Pulmonary     breath sounds clear to auscultation  (-) shortness of breath, recent URI, not a smoker  Cardiovascular   Exercise tolerance: good (4-7 METS)    Rhythm: regular  Rate: normal    (-) past MI, dysrhythmias, angina      Neuro/Psych  (-) seizures, CVA  GI/Hepatic/Renal/Endo    (+)  GERD,    (-)  obesity, liver disease, no renal disease, diabetes    Musculoskeletal     (-) neck stiffness  Abdominal    Substance History      OB/GYN          Other        (-) blood dyscrasia                  Anesthesia Plan    ASA 2 - emergent     general     intravenous induction     Anesthetic plan, all risks, benefits, and alternatives have been provided, discussed and informed consent has been obtained with: patient.    Plan discussed with CRNA.

## 2021-09-25 NOTE — PLAN OF CARE
Goal Outcome Evaluation:  Plan of Care Reviewed With: patient        Progress: no change  Outcome Summary: recd from pacu around 1810.  aaox4. no nausea. moderate pain.  given sips clear liquids and po analgesia.  lap sites CDI with steristrips and bandaids.  due to void.

## 2021-09-25 NOTE — ED NOTES
This tech wearing appropriate PPE. Pt wearing mask during encounter.       Ros Zamora, PCT  09/25/21 0934

## 2021-09-25 NOTE — ED TRIAGE NOTES
States pain from neck down to lower back X 1 month that has been worse X 1 week. Denies injury. Seen here recently for same.     Pt was wearing a mask during assessment.  This RN wore appropriate PPE

## 2021-09-25 NOTE — OP NOTE
Operative Note :   MD Mary Bloom  1970    Procedure Date: 09/25/21    Pre-op Diagnosis:  Calculus of gallbladder with cholecystitis without biliary obstruction, unspecified cholecystitis acuity [K80.10]    Post-Op Diagnosis:  Same    Procedure:   · Laparoscopic cholecystectomy    Surgeon: Leno Segura MD    Assistant: Trevor Michelle CSA was responsible for performing the following activities: Retraction, Suction, Irrigation, Suturing, Closing, Placing Dressing and Held/Positioned Camera and their skilled assistance was necessary for the success of this case.    Anesthesia:  General Anesthesia via Endotracheal Tube    EBL:   minimal    Specimens:   Gallbladder and contents    Indications:  · 51-year-old patient with sustained biliary colic and second trip to emergency department this week    Findings:   · Large stone at the neck of the gallbladder    Recommendations:   · Routine post cholecystectomy care    Description of procedure:    After obtaining informed consent, patient was brought to the operating room and placed under a general anesthetic.  The abdomen was sterilely prepped and draped.  Supraumbilical skin incision was made and dissected through the subcutaneous tissue.  Fascia was incised in the midline.  #1 Vicryl suture was placed on each side of the fascia in a U stitch pattern.  The peritoneum was bluntly penetrated.  Hernandez trocar was introduced and the abdomen was insufflated with CO2.  Cursory examination of the abdomen was unremarkable.  Additional 5 mm trocars were introduced, one in the epigastrium and 2 in the right upper quadrant.  Fundus of the gallbladder was identified, grasped and retracted cephalad.  Some light adhesions of the omentum and duodenum were stripped down, exposing the infundibulum.  The infundibulum was then grasped and retracted laterally.  The peritoneum overlying the cystic triangle was identified and peeled down.  The cystic duct was  identified and dissected completely.  The cystic artery was identified and dissected completely.  The remainder of the cystic triangle was cleared out and the lower half of the gallbladder was then mobilized off the bed of the liver, giving the critical view of safety.  2 clips were placed proximally and one distally on the cystic artery.  A clip was placed on the gallbladder side of the gallbladder cystic duct junction.  An incision was made in the cystic duct.  While passing the cholangiocatheter into the cystic duct, the duct tore.  I elected to abort the cholangiogram.  The cystic duct stump was lifted up and we are able to easily get 2 clips on the stump.  There was no evidence of bile leak.  The cystic artery was divided between the previously placed clips. The gallbladder was then removed from the bed of the liver with electrocautery and then placed in an Endo Catch bag and removed.  The abdomen was irrigated.  Any small bleeders on the liver bed were cauterized.  The trochars were withdrawn under direct visualization.  The supraumbilical fascial incision was reapproximated with #1 Vicryl suture.  Skin incisions were closed with 4-0 Monocryl.  Sterile dressings were applied.    Leno Segura MD  General and Endoscopic Surgery  [facility]    [address], [address]  [place], [place], [address]  P: [phone]

## 2021-09-25 NOTE — ED NOTES
Pt denies neck pain. Pt states epigastric pain and right back pain x1 week. Pt was seen here a few days ago for the same thing and states pain has gotten much worse since then. Pt states she is having some nausea, but denies v/d, fevers, or urinary symptoms. Pt very tearful at this time.     Patient was placed in face mask during triage process. Patient was wearing facemask when I entered the room and throughout our encounter. I wore full protective equipment throughout this patient encounter including a face mask, eye protection, and gloves. Hand hygiene was performed before donning protective equipment and again following doffing of PPE after leaving the room.       Jeri Lei, RN  09/25/21 0929       Jeri Lei RN  09/25/21 [id number]

## 2021-09-25 NOTE — ED NOTES
Pt now also c/o chest pain and states she feels like she is going to pass out. EKG ordered. Safety WDL     Jeri Lei RN  09/25/21 9459

## 2021-09-26 VITALS
OXYGEN SATURATION: 99 % | TEMPERATURE: 97.7 F | SYSTOLIC BLOOD PRESSURE: 105 MMHG | HEIGHT: 64 IN | WEIGHT: 118 LBS | HEART RATE: 65 BPM | RESPIRATION RATE: 16 BRPM | BODY MASS INDEX: 20.14 KG/M2 | DIASTOLIC BLOOD PRESSURE: 68 MMHG

## 2021-09-26 LAB
ALBUMIN SERPL-MCNC: 4.1 G/DL (ref 3.5–5.2)
ALBUMIN/GLOB SERPL: 1.3 G/DL
ALP SERPL-CCNC: 84 U/L (ref 39–117)
ALT SERPL W P-5'-P-CCNC: 31 U/L (ref 1–33)
ANION GAP SERPL CALCULATED.3IONS-SCNC: 10.4 MMOL/L (ref 5–15)
AST SERPL-CCNC: 39 U/L (ref 1–32)
BASOPHILS # BLD AUTO: 0.01 10*3/MM3 (ref 0–0.2)
BASOPHILS NFR BLD AUTO: 0.2 % (ref 0–1.5)
BILIRUB SERPL-MCNC: 0.3 MG/DL (ref 0–1.2)
BUN SERPL-MCNC: 10 MG/DL (ref 6–20)
BUN/CREAT SERPL: 11.1 (ref 7–25)
CALCIUM SPEC-SCNC: 9.2 MG/DL (ref 8.6–10.5)
CHLORIDE SERPL-SCNC: 103 MMOL/L (ref 98–107)
CO2 SERPL-SCNC: 22.6 MMOL/L (ref 22–29)
CREAT SERPL-MCNC: 0.9 MG/DL (ref 0.57–1)
DEPRECATED RDW RBC AUTO: 42.1 FL (ref 37–54)
EOSINOPHIL # BLD AUTO: 0 10*3/MM3 (ref 0–0.4)
EOSINOPHIL NFR BLD AUTO: 0 % (ref 0.3–6.2)
ERYTHROCYTE [DISTWIDTH] IN BLOOD BY AUTOMATED COUNT: 12.1 % (ref 12.3–15.4)
GFR SERPL CREATININE-BSD FRML MDRD: 65 ML/MIN/1.73
GLOBULIN UR ELPH-MCNC: 3.2 GM/DL
GLUCOSE SERPL-MCNC: 123 MG/DL (ref 65–99)
HCT VFR BLD AUTO: 39.1 % (ref 34–46.6)
HGB BLD-MCNC: 12.9 G/DL (ref 12–15.9)
IMM GRANULOCYTES # BLD AUTO: 0.02 10*3/MM3 (ref 0–0.05)
IMM GRANULOCYTES NFR BLD AUTO: 0.3 % (ref 0–0.5)
LYMPHOCYTES # BLD AUTO: 1.15 10*3/MM3 (ref 0.7–3.1)
LYMPHOCYTES NFR BLD AUTO: 17.7 % (ref 19.6–45.3)
MCH RBC QN AUTO: 31 PG (ref 26.6–33)
MCHC RBC AUTO-ENTMCNC: 33 G/DL (ref 31.5–35.7)
MCV RBC AUTO: 94 FL (ref 79–97)
MONOCYTES # BLD AUTO: 0.41 10*3/MM3 (ref 0.1–0.9)
MONOCYTES NFR BLD AUTO: 6.3 % (ref 5–12)
NEUTROPHILS NFR BLD AUTO: 4.92 10*3/MM3 (ref 1.7–7)
NEUTROPHILS NFR BLD AUTO: 75.5 % (ref 42.7–76)
NRBC BLD AUTO-RTO: 0 /100 WBC (ref 0–0.2)
PLATELET # BLD AUTO: 325 10*3/MM3 (ref 140–450)
PMV BLD AUTO: 9 FL (ref 6–12)
POTASSIUM SERPL-SCNC: 4 MMOL/L (ref 3.5–5.2)
PROT SERPL-MCNC: 7.3 G/DL (ref 6–8.5)
QT INTERVAL: 378 MS
RBC # BLD AUTO: 4.16 10*6/MM3 (ref 3.77–5.28)
SODIUM SERPL-SCNC: 136 MMOL/L (ref 136–145)
WBC # BLD AUTO: 6.51 10*3/MM3 (ref 3.4–10.8)

## 2021-09-26 PROCEDURE — 85025 COMPLETE CBC W/AUTO DIFF WBC: CPT | Performed by: SURGERY

## 2021-09-26 PROCEDURE — 25010000002 ONDANSETRON PER 1 MG: Performed by: SURGERY

## 2021-09-26 PROCEDURE — 80053 COMPREHEN METABOLIC PANEL: CPT | Performed by: SURGERY

## 2021-09-26 PROCEDURE — 25010000002 KETOROLAC TROMETHAMINE PER 15 MG: Performed by: SURGERY

## 2021-09-26 PROCEDURE — 99024 POSTOP FOLLOW-UP VISIT: CPT | Performed by: SURGERY

## 2021-09-26 PROCEDURE — G0378 HOSPITAL OBSERVATION PER HR: HCPCS

## 2021-09-26 RX ORDER — ONDANSETRON 4 MG/1
4 TABLET, ORALLY DISINTEGRATING ORAL EVERY 8 HOURS PRN
Qty: 10 TABLET | Refills: 0 | Status: SHIPPED | OUTPATIENT
Start: 2021-09-26 | End: 2021-10-03

## 2021-09-26 RX ORDER — HYDROCODONE BITARTRATE AND ACETAMINOPHEN 7.5; 325 MG/1; MG/1
1 TABLET ORAL EVERY 6 HOURS PRN
Qty: 24 TABLET | Refills: 0 | Status: SHIPPED | OUTPATIENT
Start: 2021-09-26 | End: 2021-10-03

## 2021-09-26 RX ADMIN — HYDROCODONE BITARTRATE AND ACETAMINOPHEN 1 TABLET: 7.5; 325 TABLET ORAL at 10:25

## 2021-09-26 RX ADMIN — KETOROLAC TROMETHAMINE 30 MG: 30 INJECTION, SOLUTION INTRAMUSCULAR at 00:50

## 2021-09-26 RX ADMIN — KETOROLAC TROMETHAMINE 30 MG: 30 INJECTION, SOLUTION INTRAMUSCULAR at 08:40

## 2021-09-26 RX ADMIN — ONDANSETRON 4 MG: 2 INJECTION INTRAMUSCULAR; INTRAVENOUS at 10:25

## 2021-09-26 RX ADMIN — ONDANSETRON 4 MG: 2 INJECTION INTRAMUSCULAR; INTRAVENOUS at 04:06

## 2021-09-26 RX ADMIN — HYDROCODONE BITARTRATE AND ACETAMINOPHEN 1 TABLET: 7.5; 325 TABLET ORAL at 04:16

## 2021-09-26 NOTE — PROGRESS NOTES
Postop day 1 lap olivia    Feels pretty well.  Preop pain much improved.  She did have some nausea earlier this morning but is feeling better.    Objective:  Afebrile with stable vitals  General: Awake alert, no distress  Eyes: No icterus  Abdomen: Soft, benign, dressings are clean    Labs reviewed, unremarkable    Assessment and plan:  -Sustained biliary colic, now status post laparoscopic cholecystectomy, recovering well  -Plan for home later today  -Follow-up with me in 2 weeks    Leno Segura MD  General and Endoscopic Surgery  [facility]    [address], [address]  [place], [place], [address]  P: [phone]  F: [phone]

## 2021-09-26 NOTE — PLAN OF CARE
Goal Outcome Evaluation:  Plan of Care Reviewed With: patient        Progress: improving  Outcome Summary: VSS.  Scheduled Toradol given.  PRN dilaudid given x1.  Ambulated in the [place] several times.  voiding freely. lap sites  with bandaids CDI.

## 2021-09-26 NOTE — DISCHARGE SUMMARY
Discharge Summary    Patient name: Mary Jensen    Medical record number: [id number]    Admission date: 9/25/2021  Discharge date: 9/26/2021    Attending physician: Leno Segura MD    Primary care physician: Provider, No Known    Referring physician: No referring provider defined for this encounter.    Consulting physician(s): None    Condition on discharge: improving    Primary Diagnoses: Sustained biliary colic    Secondary Diagnoses: None    Operative Procedure: Laparoscopic cholecystectomy    Hospital Course: The patient is a  51 y.o. female that was admitted to the hospital with acute epigastric abdominal pain and finding of stones.  She was brought to the operating room and underwent uneventful laparoscopic cholecystectomy.  Cystic duct tore and cholangiogram not able to be performed.  Did well overnight, discharged home the next day feeling reasonably well.    Discharge medications:      Discharge Medications      New Medications      Instructions Start Date   HYDROcodone-acetaminophen 7.5-325 MG per tablet  Commonly known as: NORCO   1 tablet, Oral, Every 6 Hours PRN         Changes to Medications      Instructions Start Date   ondansetron ODT 4 MG disintegrating tablet  Commonly known as: ZOFRAN-ODT  What changed:   · how to take this  · when to take this  · reasons to take this   4 mg, Translingual, Every 8 Hours PRN         Continue These Medications      Instructions Start Date   hydrOXYzine 25 MG tablet  Commonly known as: ATARAX   50 mg, Oral      nizatidine 150 MG capsule  Commonly known as: AXID   150 mg, Oral, Daily      omeprazole 40 MG capsule  Commonly known as: priLOSEC   TAKE 1 CAPSULE BY MOUTH ONCE DAILY 30 60 MINUTES BEFORE MEAL      promethazine 25 MG tablet  Commonly known as: PHENERGAN   25 mg, Oral, Every 6 Hours PRN      sucralfate 1 g tablet  Commonly known as: CARAFATE   1 g, Oral, 4 Times Daily             Discharge instructions: No lifting over 10 pounds, diet as tolerated,  gradually increase activity.      Follow-up appointment: Follow up with Leno Segura MD in the office in 2 weeks. Call for appointment at [phone].

## 2021-09-27 NOTE — PROGRESS NOTES
Case Management Discharge Note      Final Note: Discharged home. Divina Santana, JESSIE         Transportation Services  Private: Car    Final Discharge Disposition Code: 01 - home or self-care

## 2021-09-28 LAB
LAB AP CASE REPORT: NORMAL
PATH REPORT.FINAL DX SPEC: NORMAL
PATH REPORT.GROSS SPEC: NORMAL

## 2021-09-30 ENCOUNTER — TELEPHONE (OUTPATIENT)
Dept: SURGERY | Facility: CLINIC | Age: 51
End: 2021-09-30

## 2021-09-30 NOTE — TELEPHONE ENCOUNTER
The patients spouse had called earlier stating that his wife, Mary, is still experiencing nausea following her Laparoscopic cholecystectomy on 9/25/2021 by Dr. Segura. She has not had any vomiting, diarrhea or fever that he knows of nor is there a problem with her pain. He confirmed that she is taking the Ondansetron 8 mg every eight hours and also has taken promethazine 25 mg without any relief.  Dr. Segura is currently out on vacation therefore I spoke with the on-call provider, Dr. Williamson and advised him on all of the above. He does not have any alternate recommendations for medications but does suggest she continue to take the Ondansetron & Promethazine as it is not uncommon for patients to continue to experience nausea following gallbladder surgery and it can take sometime for this to fully resolve.  I informed the patient's spouse of Dr. Williamson's response and to call back if her symptoms become worse or she develops any new symptoms. Patient's spouse verbalized understanding.

## 2021-10-01 ENCOUNTER — APPOINTMENT (OUTPATIENT)
Dept: CT IMAGING | Facility: HOSPITAL | Age: 51
End: 2021-10-01

## 2021-10-01 ENCOUNTER — HOSPITAL ENCOUNTER (EMERGENCY)
Facility: HOSPITAL | Age: 51
Discharge: HOME | End: 2021-10-01
Attending: EMERGENCY MEDICINE | Admitting: EMERGENCY MEDICINE

## 2021-10-01 ENCOUNTER — NURSE TRIAGE (OUTPATIENT)
Dept: CALL CENTER | Facility: HOSPITAL | Age: 51
End: 2021-10-01

## 2021-10-01 VITALS
TEMPERATURE: 97.3 F | HEART RATE: 75 BPM | SYSTOLIC BLOOD PRESSURE: 110 MMHG | OXYGEN SATURATION: 100 % | RESPIRATION RATE: 22 BRPM | DIASTOLIC BLOOD PRESSURE: 82 MMHG

## 2021-10-01 DIAGNOSIS — R11.14 BILIOUS VOMITING WITH NAUSEA: Primary | ICD-10-CM

## 2021-10-01 LAB
ALBUMIN SERPL-MCNC: 4.8 G/DL (ref 3.5–5.2)
ALBUMIN/GLOB SERPL: 1.3 G/DL
ALP SERPL-CCNC: 112 U/L (ref 39–117)
ALT SERPL W P-5'-P-CCNC: 21 U/L (ref 1–33)
ANION GAP SERPL CALCULATED.3IONS-SCNC: 13.6 MMOL/L (ref 5–15)
AST SERPL-CCNC: 17 U/L (ref 1–32)
BACTERIA UR QL AUTO: ABNORMAL /HPF
BASOPHILS # BLD AUTO: 0.04 10*3/MM3 (ref 0–0.2)
BASOPHILS NFR BLD AUTO: 0.5 % (ref 0–1.5)
BILIRUB SERPL-MCNC: 0.3 MG/DL (ref 0–1.2)
BILIRUB UR QL STRIP: NEGATIVE
BUN SERPL-MCNC: 12 MG/DL (ref 6–20)
BUN/CREAT SERPL: 14.6 (ref 7–25)
CALCIUM SPEC-SCNC: 10.2 MG/DL (ref 8.6–10.5)
CHLORIDE SERPL-SCNC: 102 MMOL/L (ref 98–107)
CLARITY UR: CLEAR
CO2 SERPL-SCNC: 23.4 MMOL/L (ref 22–29)
COLOR UR: YELLOW
CREAT SERPL-MCNC: 0.82 MG/DL (ref 0.57–1)
DEPRECATED RDW RBC AUTO: 44.6 FL (ref 37–54)
EOSINOPHIL # BLD AUTO: 0.06 10*3/MM3 (ref 0–0.4)
EOSINOPHIL NFR BLD AUTO: 0.8 % (ref 0.3–6.2)
ERYTHROCYTE [DISTWIDTH] IN BLOOD BY AUTOMATED COUNT: 12.4 % (ref 12.3–15.4)
GFR SERPL CREATININE-BSD FRML MDRD: 73 ML/MIN/1.73
GLOBULIN UR ELPH-MCNC: 3.7 GM/DL
GLUCOSE SERPL-MCNC: 96 MG/DL (ref 65–99)
GLUCOSE UR STRIP-MCNC: NEGATIVE MG/DL
HCT VFR BLD AUTO: 46.1 % (ref 34–46.6)
HGB BLD-MCNC: 14.7 G/DL (ref 12–15.9)
HGB UR QL STRIP.AUTO: NEGATIVE
HOLD SPECIMEN: NORMAL
HOLD SPECIMEN: NORMAL
HYALINE CASTS UR QL AUTO: ABNORMAL /LPF
IMM GRANULOCYTES # BLD AUTO: 0.02 10*3/MM3 (ref 0–0.05)
IMM GRANULOCYTES NFR BLD AUTO: 0.3 % (ref 0–0.5)
KETONES UR QL STRIP: NEGATIVE
LEUKOCYTE ESTERASE UR QL STRIP.AUTO: ABNORMAL
LIPASE SERPL-CCNC: 53 U/L (ref 13–60)
LYMPHOCYTES # BLD AUTO: 2 10*3/MM3 (ref 0.7–3.1)
LYMPHOCYTES NFR BLD AUTO: 26 % (ref 19.6–45.3)
MCH RBC QN AUTO: 30.6 PG (ref 26.6–33)
MCHC RBC AUTO-ENTMCNC: 31.9 G/DL (ref 31.5–35.7)
MCV RBC AUTO: 95.8 FL (ref 79–97)
MONOCYTES # BLD AUTO: 0.46 10*3/MM3 (ref 0.1–0.9)
MONOCYTES NFR BLD AUTO: 6 % (ref 5–12)
NEUTROPHILS NFR BLD AUTO: 5.12 10*3/MM3 (ref 1.7–7)
NEUTROPHILS NFR BLD AUTO: 66.4 % (ref 42.7–76)
NITRITE UR QL STRIP: NEGATIVE
NRBC BLD AUTO-RTO: 0 /100 WBC (ref 0–0.2)
PH UR STRIP.AUTO: 6 [PH] (ref 5–8)
PLATELET # BLD AUTO: 470 10*3/MM3 (ref 140–450)
PMV BLD AUTO: 8.9 FL (ref 6–12)
POTASSIUM SERPL-SCNC: 3.9 MMOL/L (ref 3.5–5.2)
PROT SERPL-MCNC: 8.5 G/DL (ref 6–8.5)
PROT UR QL STRIP: NEGATIVE
RBC # BLD AUTO: 4.81 10*6/MM3 (ref 3.77–5.28)
RBC # UR: ABNORMAL /HPF
REF LAB TEST METHOD: ABNORMAL
SODIUM SERPL-SCNC: 139 MMOL/L (ref 136–145)
SP GR UR STRIP: 1.01 (ref 1–1.03)
SQUAMOUS #/AREA URNS HPF: ABNORMAL /HPF
UROBILINOGEN UR QL STRIP: ABNORMAL
WBC # BLD AUTO: 7.7 10*3/MM3 (ref 3.4–10.8)
WBC UR QL AUTO: ABNORMAL /HPF
WHOLE BLOOD HOLD SPECIMEN: NORMAL
WHOLE BLOOD HOLD SPECIMEN: NORMAL

## 2021-10-01 PROCEDURE — 25010000002 IOPAMIDOL 61 % SOLUTION: Performed by: EMERGENCY MEDICINE

## 2021-10-01 PROCEDURE — 99283 EMERGENCY DEPT VISIT LOW MDM: CPT

## 2021-10-01 PROCEDURE — 25010000002 LORAZEPAM PER 2 MG: Performed by: EMERGENCY MEDICINE

## 2021-10-01 PROCEDURE — 96374 THER/PROPH/DIAG INJ IV PUSH: CPT

## 2021-10-01 PROCEDURE — 83690 ASSAY OF LIPASE: CPT | Performed by: EMERGENCY MEDICINE

## 2021-10-01 PROCEDURE — 25010000002 ONDANSETRON PER 1 MG: Performed by: EMERGENCY MEDICINE

## 2021-10-01 PROCEDURE — 85025 COMPLETE CBC W/AUTO DIFF WBC: CPT | Performed by: EMERGENCY MEDICINE

## 2021-10-01 PROCEDURE — 81001 URINALYSIS AUTO W/SCOPE: CPT | Performed by: EMERGENCY MEDICINE

## 2021-10-01 PROCEDURE — 96375 TX/PRO/DX INJ NEW DRUG ADDON: CPT

## 2021-10-01 PROCEDURE — 74177 CT ABD & PELVIS W/CONTRAST: CPT

## 2021-10-01 PROCEDURE — 80053 COMPREHEN METABOLIC PANEL: CPT | Performed by: EMERGENCY MEDICINE

## 2021-10-01 RX ORDER — PANTOPRAZOLE SODIUM 40 MG/1
40 TABLET, DELAYED RELEASE ORAL DAILY
Qty: 30 TABLET | Refills: 0 | Status: SHIPPED | OUTPATIENT
Start: 2021-10-01 | End: 2021-10-03

## 2021-10-01 RX ORDER — SODIUM CHLORIDE 0.9 % (FLUSH) 0.9 %
10 SYRINGE (ML) INJECTION AS NEEDED
Status: DISCONTINUED | OUTPATIENT
Start: 2021-10-01 | End: 2021-10-01 | Stop reason: HOSPADM

## 2021-10-01 RX ORDER — LORAZEPAM 2 MG/ML
1 INJECTION INTRAMUSCULAR ONCE
Status: COMPLETED | OUTPATIENT
Start: 2021-10-01 | End: 2021-10-01

## 2021-10-01 RX ORDER — ONDANSETRON 2 MG/ML
4 INJECTION INTRAMUSCULAR; INTRAVENOUS ONCE
Status: COMPLETED | OUTPATIENT
Start: 2021-10-01 | End: 2021-10-01

## 2021-10-01 RX ORDER — PANTOPRAZOLE SODIUM 40 MG/10ML
80 INJECTION, POWDER, LYOPHILIZED, FOR SOLUTION INTRAVENOUS ONCE
Status: COMPLETED | OUTPATIENT
Start: 2021-10-01 | End: 2021-10-01

## 2021-10-01 RX ORDER — METOCLOPRAMIDE 10 MG/1
10 TABLET ORAL
Qty: 30 TABLET | Refills: 0 | Status: SHIPPED | OUTPATIENT
Start: 2021-10-01 | End: 2021-10-03

## 2021-10-01 RX ADMIN — PANTOPRAZOLE SODIUM 80 MG: 40 INJECTION, POWDER, FOR SOLUTION INTRAVENOUS at 17:22

## 2021-10-01 RX ADMIN — ONDANSETRON 4 MG: 2 INJECTION INTRAMUSCULAR; INTRAVENOUS at 17:18

## 2021-10-01 RX ADMIN — LORAZEPAM 1 MG: 2 INJECTION INTRAMUSCULAR; INTRAVENOUS at 17:31

## 2021-10-01 RX ADMIN — SODIUM CHLORIDE 1000 ML: 9 INJECTION, SOLUTION INTRAVENOUS at 17:34

## 2021-10-01 RX ADMIN — IOPAMIDOL 85 ML: 612 INJECTION, SOLUTION INTRAVENOUS at 18:32

## 2021-10-01 NOTE — ED PROVIDER NOTES
" EMERGENCY DEPARTMENT ENCOUNTER    Room Number:  38/38  Date of encounter:  10/1/2021  PCP: Provider, No Known  Historian: Patient      HPI:  Chief Complaint: Nausea and vomiting  A complete HPI/ROS/PMH/PSH/SH/FH are unobtainable due to: None    Context: Mary Jensen is a 51 y.o. female who presents to the ED c/o severe nausea and vomiting for the last 2 days.  Patient says she is experiencing a lot of acid reflux type symptoms, burning and acrid taste in her mouth.  She is also vomiting some bilious looking material.  She has pain in the epigastric area and has been moving her bowels properly.  She is tried Zofran and Phenergan at home without improvement, and was instructed by her surgeon's office to come to the emergency department.    Patient had laparoscopic cholecystectomy 6 days ago by Dr. Segura.  From reviewing the records, it appears that the surgery went well without any complications.  Patient said that everything was going \"just great\" until 2 days ago when she felt the sudden fullness in her upper abdomen followed by the onset of vomiting which has not stopped since.    She has no fever, no cough or chest pain, no shortness of breath, but she is admittedly extremely anxious and scared.      PAST MEDICAL HISTORY  Active Ambulatory Problems     Diagnosis Date Noted   • Calculus of gallbladder with cholecystitis without biliary obstruction 09/25/2021     Resolved Ambulatory Problems     Diagnosis Date Noted   • No Resolved Ambulatory Problems     No Additional Past Medical History         PAST SURGICAL HISTORY  Past Surgical History:   Procedure Laterality Date   • CHOLECYSTECTOMY WITH INTRAOPERATIVE CHOLANGIOGRAM N/A 9/25/2021    Procedure: Laparoscopic cholecystectomy;  Surgeon: Leno Segura MD;  Location: [facility]  Service: General;  Laterality: N/A;         FAMILY HISTORY  No family history on file.      SOCIAL HISTORY  Social History     Socioeconomic History   • Marital status:  "     Spouse name: Not on file   • Number of children: Not on file   • Years of education: Not on file   • Highest education level: Not on file   Tobacco Use   • Smoking status: Never Smoker   • Smokeless tobacco: Never Used   Vaping Use   • Vaping Use: Never used         ALLERGIES  Patient has no known allergies.        REVIEW OF SYSTEMS  Review of Systems     All systems reviewed and negative except for those discussed in HPI.       PHYSICAL EXAM    I have reviewed the triage vital signs and nursing notes.    ED Triage Vitals   Temp Heart Rate Resp BP SpO2   10/01/21 1645 10/01/21 1645 10/01/21 1645 10/01/21 1646 10/01/21 1645   97.3 °F (36.3 °C) (!) 121 24 (!) 75/44 95 %      Temp src Heart Rate Source Patient Position BP Location FiO2 (%)   -- -- -- -- --              Physical Exam  GENERAL: Awake and alert, emotional, hyperventilating and tearful  HENT: nares patent  EYES: no scleral icterus  CV: Tachycardic  RESPIRATORY: Hyperventilating  ABDOMEN: soft, mild epigastric tenderness without rebound or guarding.  Abdomen is not distended, incisions are clean and intact with Steri-Strips.  Bowel sounds are present  MUSCULOSKELETAL: no deformity  NEURO: alert, moves all extremities, follows commands  SKIN: warm, dry        LAB RESULTS  Recent Results (from the past 24 hour(s))   Comprehensive Metabolic Panel    Collection Time: 10/01/21  5:04 PM    Specimen: Blood   Result Value Ref Range    Glucose 96 65 - 99 mg/dL    BUN 12 6 - 20 mg/dL    Creatinine 0.82 0.57 - 1.00 mg/dL    Sodium 139 136 - 145 mmol/L    Potassium 3.9 3.5 - 5.2 mmol/L    Chloride 102 98 - 107 mmol/L    CO2 23.4 22.0 - 29.0 mmol/L    Calcium 10.2 8.6 - 10.5 mg/dL    Total Protein 8.5 6.0 - 8.5 g/dL    Albumin 4.80 3.50 - 5.20 g/dL    ALT (SGPT) 21 1 - 33 U/L    AST (SGOT) 17 1 - 32 U/L    Alkaline Phosphatase 112 39 - 117 U/L    Total Bilirubin 0.3 0.0 - 1.2 mg/dL    eGFR Non African Amer 73 >60 mL/min/1.73    Globulin 3.7 gm/dL    A/G Ratio 1.3  g/dL    BUN/Creatinine Ratio 14.6 7.0 - 25.0    Anion Gap 13.6 5.0 - 15.0 mmol/L   Lipase    Collection Time: 10/01/21  5:04 PM    Specimen: Blood   Result Value Ref Range    Lipase 53 13 - 60 U/L   Green Top (Gel)    Collection Time: 10/01/21  5:04 PM   Result Value Ref Range    Extra Tube Hold for add-ons.    Lavender Top    Collection Time: 10/01/21  5:04 PM   Result Value Ref Range    Extra Tube hold for add-on    Gold Top - SST    Collection Time: 10/01/21  5:04 PM   Result Value Ref Range    Extra Tube Hold for add-ons.    Light Blue Top    Collection Time: 10/01/21  5:04 PM   Result Value Ref Range    Extra Tube hold for add-on    CBC Auto Differential    Collection Time: 10/01/21  5:04 PM    Specimen: Blood   Result Value Ref Range    WBC 7.70 3.40 - 10.80 10*3/mm3    RBC 4.81 3.77 - 5.28 10*6/mm3    Hemoglobin 14.7 12.0 - 15.9 g/dL    Hematocrit 46.1 34.0 - 46.6 %    MCV 95.8 79.0 - 97.0 fL    MCH 30.6 26.6 - 33.0 pg    MCHC 31.9 31.5 - 35.7 g/dL    RDW 12.4 12.3 - 15.4 %    RDW-SD 44.6 37.0 - 54.0 fl    MPV 8.9 6.0 - 12.0 fL    Platelets 470 (H) 140 - 450 10*3/mm3    Neutrophil % 66.4 42.7 - 76.0 %    Lymphocyte % 26.0 19.6 - 45.3 %    Monocyte % 6.0 5.0 - 12.0 %    Eosinophil % 0.8 0.3 - 6.2 %    Basophil % 0.5 0.0 - 1.5 %    Immature Grans % 0.3 0.0 - 0.5 %    Neutrophils, Absolute 5.12 1.70 - 7.00 10*3/mm3    Lymphocytes, Absolute 2.00 0.70 - 3.10 10*3/mm3    Monocytes, Absolute 0.46 0.10 - 0.90 10*3/mm3    Eosinophils, Absolute 0.06 0.00 - 0.40 10*3/mm3    Basophils, Absolute 0.04 0.00 - 0.20 10*3/mm3    Immature Grans, Absolute 0.02 0.00 - 0.05 10*3/mm3    nRBC 0.0 0.0 - 0.2 /100 WBC   Urinalysis With Microscopic If Indicated (No Culture) - Urine, Clean Catch    Collection Time: 10/01/21  6:47 PM    Specimen: Urine, Clean Catch   Result Value Ref Range    Color, UA Yellow Yellow, Straw    Appearance, UA Clear Clear    pH, UA 6.0 5.0 - 8.0    Specific Gravity, UA 1.011 1.005 - 1.030    Glucose, UA  Negative Negative    Ketones, UA Negative Negative    Bilirubin, UA Negative Negative    Blood, UA Negative Negative    Protein, UA Negative Negative    Leuk Esterase, UA Moderate (2+) (A) Negative    Nitrite, UA Negative Negative    Urobilinogen, UA 0.2 E.U./dL 0.2 - 1.0 E.U./dL   Urinalysis, Microscopic Only - Urine, Clean Catch    Collection Time: 10/01/21  6:47 PM    Specimen: Urine, Clean Catch   Result Value Ref Range    RBC, UA 0-2 None Seen, 0-2 /HPF    WBC, UA 6-12 (A) None Seen, 0-2 /HPF    Bacteria, UA None Seen None Seen /HPF    Squamous Epithelial Cells, UA 0-2 None Seen, 0-2 /HPF    Hyaline Casts, UA 3-6 None Seen /LPF    Methodology Automated Microscopy        Ordered the above labs and independently reviewed the results.        RADIOLOGY  CT Abdomen Pelvis With Contrast    Result Date: 10/1/2021  CT SCAN OF THE ABDOMEN AND PELVIS WITH INTRAVENOUS CONTRAST  HISTORY: Recent cystectomy 6 days ago. Nausea and vomiting. Some epigastric pain.  FINDINGS: The CT scan was performed as an emergency procedure through the abdomen and pelvis with intravenous contrast. It is compared to the preoperative CT scan of the abdomen and pelvis dated 2021 and demonstrates the followin. The lung bases are clear. The liver, spleen, pancreas, both adrenal glands, and both kidneys appear normal and unchanged. There is some very minimal effacement of the mesenteric fat in the region of the gallbladder fossa but no abscess collection is seen. 2. The aorta and retroperitoneal structures appear normal. The large and small bowel loops are normal in caliber and show no inflammatory change. No abnormality is seen in the pelvis. 3. There is a tiny dot of air and some effacement of the subcutaneous fat planes near the umbilicus at the site of recent trocar insertion. Again, no significant fluid collection is seen but clinical correlation to this area is recommended.      Radiation dose reduction techniques were utilized,  including automated exposure control and exposure modulation based on body size.  This report was finalized on 10/1/2021 7:51 PM by Dr. Jose Juan Duggan M.D.        I ordered the above noted radiological studies. Reviewed by me and discussed with radiologist.  See dictation for official radiology interpretation.      PROCEDURES    Procedures      MEDICATIONS GIVEN IN ER    Medications   sodium chloride 0.9 % flush 10 mL (has no administration in time range)   sodium chloride 0.9 % bolus 1,000 mL (0 mL Intravenous Stopped 10/1/21 1940)   ondansetron (ZOFRAN) injection 4 mg (4 mg Intravenous Given 10/1/21 1718)   LORazepam (ATIVAN) injection 1 mg (1 mg Intravenous Given 10/1/21 1731)   pantoprazole (PROTONIX) injection 80 mg (80 mg Intravenous Given 10/1/21 1722)   iopamidol (ISOVUE-300) 61 % injection 100 mL (85 mL Intravenous Given by Other 10/1/21 1832)         PROGRESS, DATA ANALYSIS, CONSULTS, AND MEDICAL DECISION MAKING    All labs have been independently reviewed by me.  All radiology studies have been reviewed by me and discussed with radiologist dictating the report.   EKG's independently viewed and interpreted by me.  Discussion below represents my analysis of pertinent findings related to patient's condition, differential diagnosis, treatment plan and final disposition.        ED Course as of Oct 01 2122   Fri Oct 01, 2021   1708 Immediately ordered a liter of normal saline bolus, Zofran and Ativan along with Protonix.    The hypotension documented in the triage area appears to been an erroneous reading.  At the bedside currently I see a BP of 110/82.    [DP]   2122 CBC and chemistry are unremarkable including LFTs    [DP]   2122 Urinalysis unremarkable, lipase normal    [DP]   2122 CT scan of the abdomen pelvis shows no acute intra-abdominal abnormality.  The surgical bed appears within normal limits.  There is no sign of obstruction or free fluid within the abdomen.  No sign of abscess or biliary collection     [DP]   2122 Patient improved dramatically with IV lorazepam, Zofran and Protonix along with some fluids.  She seemed much more calm now and reassured.  I discussed all the results with her and she agrees with the plan for discharge and follow-up    [DP]      ED Course User Index  [DP] Art Bae MD           PPE: The patient wore a surgical mask throughout the entire patient encounter. I wore an N95.    AS OF 21:22 EDT VITALS:    BP - 110/82  HR - 75  TEMP - 97.3 °F (36.3 °C)  O2 SATS - 100%        DIAGNOSIS  Final diagnoses:   Bilious vomiting with nausea         DISPOSITION  Discharge           Art Bae MD  10/01/21 2122

## 2021-10-01 NOTE — ED TRIAGE NOTES
Pt had cholecystectomy 9/25/21 by Dr. Norman called office twice with no call back per patient. pt reports increased abd. Pain and nausea.     Pt arrives in triage with mask on. Triage staff wearing N95 masks and goggles.

## 2021-10-01 NOTE — TELEPHONE ENCOUNTER
"States she called her surgeon, got the on call and was not given any care advice just told if she is uncomfortable, go to ER.  She states \"I am not in any pain. I am just so nauseated.\" She is alternating zofran and phenergan per her MD. Has used gasex once. Also has pepcid but has not taken any.  Advised gasex and pepcid may offer relief so use them.  Need to be seen in ER if severe pain >1 hr, fever>  100.4, vomiting >4 times using her medication, abdomen becomes bloated/hard/tender/rigid or dehydration (>12 hours with no urine output).     Reason for Disposition  • Other post-op symptom or question    Additional Information  • Negative: Sounds like a life-threatening emergency to the triager  • Negative: Chest pain  • Negative: Difficulty breathing  • Negative: Acting confused (e.g., disoriented, slurred speech) or excessively sleepy  • Negative: Surgical incision symptoms and questions  • Negative: Pain or burning with passing urine (urination) and male  • Negative: Pain or burning with passing urine (urination) and female  • Negative: Constipation  • Negative: New or worsening leg (calf, thigh) pain  • Negative: New or worsening leg swelling  • Negative: Dizziness is severe, or persists > 24 hours after surgery  • Negative: Symptoms arising from use of a urinary catheter (Moser or Coude)  • Negative: Cast problems or questions  • Negative: Medication question  • Negative: Bright red, wide-spread, sunburn-like rash  • Negative: SEVERE headache and after spinal (epidural) anesthesia  • Negative: Vomiting and persists > 4 hours  • Negative: Vomiting and abdomen looks much more swollen than usual  • Negative: Drinking very little and dehydration suspected (e.g., no urine > 12 hours, very dry mouth, very lightheaded)  • Negative: Patient sounds very sick or weak to the triager  • Negative: Sounds like a serious complication to the triager  • Negative: Fever > 100.4 F (38.0 C)  • Negative: Caller has URGENT question " "and triager unable to answer question  • Negative: SEVERE post-op pain (e.g., excruciating, pain scale 8-10) that is not controlled with pain medications  • Negative: Headache and after spinal (epidural) anesthesia and not severe  • Negative: Fever present > 3 days (72 hours)  • Negative: Patient wants to be seen  • Negative: MILD TO MODERATE post-op pain (e.g., pain scale 1-7) that is not controlled with pain medications  • Negative: Caller has NON-URGENT question and triager unable to answer question  • Negative: General activity, questions about  • Negative: Resuming driving, questions about  • Negative: Resuming sexual relations, questions about  • Negative: Getting the incision wet, questions about  • Negative: Throat pain after surgery, questions about  • Negative: Vomiting lasting less than 4 hours    Answer Assessment - Initial Assessment Questions  1. SYMPTOM: \"What's the main symptom you're concerned about?\" (e.g., pain, fever, vomiting)      Extreme  nausea  2. ONSET: \"When did this  start?\"      Since surgery  3. SURGERY: \"What surgery was performed?\"      Removal gallbladder  4. DATE of SURGERY: \"When was surgery performed?\"       9/25  5. ANESTHESIA: \" What type of anesthesia did you have?\" (e.g., general, spinal, epidural, local)      general  6. PAIN: \"Is there any pain?\" If Yes, ask: \"How bad is it?\"  (Scale 1-10; or mild, moderate, severe)      no  7. FEVER: \"Do you have a fever?\" If Yes, ask: \"What is your temperature, how was it measured, and when did it start?\"      fever  8. VOMITING: \"Is there any vomiting?\" If yes, ask: \"How many times?\"      None but severe nausea  9. BLEEDING: \"Is there any bleeding?\" If Yes, ask: \"How much?\" and \"Where?\"      none  10. OTHER SYMPTOMS: \"Do you have any other symptoms?\" (e.g., drainage from wound, painful urination, constipation)        Gas and bloating. Having BM.    Protocols used: POST-OP SYMPTOMS AND QUESTIONS-ADULT-[place]      "

## 2021-10-03 ENCOUNTER — HOSPITAL ENCOUNTER (OUTPATIENT)
Facility: HOSPITAL | Age: 51
Setting detail: OBSERVATION
Discharge: HOME | End: 2021-10-06
Attending: EMERGENCY MEDICINE | Admitting: SURGERY

## 2021-10-03 ENCOUNTER — APPOINTMENT (OUTPATIENT)
Dept: CT IMAGING | Facility: HOSPITAL | Age: 51
End: 2021-10-03

## 2021-10-03 ENCOUNTER — NURSE TRIAGE (OUTPATIENT)
Dept: CALL CENTER | Facility: HOSPITAL | Age: 51
End: 2021-10-03

## 2021-10-03 ENCOUNTER — APPOINTMENT (OUTPATIENT)
Dept: NUCLEAR MEDICINE | Facility: HOSPITAL | Age: 51
End: 2021-10-03

## 2021-10-03 DIAGNOSIS — R07.89 ATYPICAL CHEST PAIN: ICD-10-CM

## 2021-10-03 DIAGNOSIS — K80.12 CALCULUS OF GALLBLADDER WITH ACUTE ON CHRONIC CHOLECYSTITIS WITHOUT OBSTRUCTION: ICD-10-CM

## 2021-10-03 DIAGNOSIS — F41.9 ANXIETY DISORDER, UNSPECIFIED TYPE: ICD-10-CM

## 2021-10-03 DIAGNOSIS — R10.13 EPIGASTRIC PAIN: Primary | ICD-10-CM

## 2021-10-03 DIAGNOSIS — Z90.49 HISTORY OF CHOLECYSTECTOMY: ICD-10-CM

## 2021-10-03 DIAGNOSIS — R11.0 NAUSEA: ICD-10-CM

## 2021-10-03 LAB
ALBUMIN SERPL-MCNC: 4.7 G/DL (ref 3.5–5.2)
ALBUMIN/GLOB SERPL: 1.5 G/DL
ALP SERPL-CCNC: 102 U/L (ref 39–117)
ALT SERPL W P-5'-P-CCNC: 15 U/L (ref 1–33)
ANION GAP SERPL CALCULATED.3IONS-SCNC: 12.1 MMOL/L (ref 5–15)
AST SERPL-CCNC: 13 U/L (ref 1–32)
BACTERIA UR QL AUTO: NORMAL /HPF
BASOPHILS # BLD AUTO: 0.04 10*3/MM3 (ref 0–0.2)
BASOPHILS NFR BLD AUTO: 0.5 % (ref 0–1.5)
BILIRUB SERPL-MCNC: 0.2 MG/DL (ref 0–1.2)
BILIRUB UR QL STRIP: NEGATIVE
BUN SERPL-MCNC: 8 MG/DL (ref 6–20)
BUN/CREAT SERPL: 10.4 (ref 7–25)
CALCIUM SPEC-SCNC: 9.7 MG/DL (ref 8.6–10.5)
CHLORIDE SERPL-SCNC: 108 MMOL/L (ref 98–107)
CLARITY UR: CLEAR
CO2 SERPL-SCNC: 23.9 MMOL/L (ref 22–29)
COLOR UR: YELLOW
CREAT SERPL-MCNC: 0.77 MG/DL (ref 0.57–1)
D DIMER PPP FEU-MCNC: 1.21 MCGFEU/ML (ref 0–0.49)
DEPRECATED RDW RBC AUTO: 43.6 FL (ref 37–54)
EOSINOPHIL # BLD AUTO: 0.06 10*3/MM3 (ref 0–0.4)
EOSINOPHIL NFR BLD AUTO: 0.8 % (ref 0.3–6.2)
ERYTHROCYTE [DISTWIDTH] IN BLOOD BY AUTOMATED COUNT: 12.3 % (ref 12.3–15.4)
GFR SERPL CREATININE-BSD FRML MDRD: 78 ML/MIN/1.73
GLOBULIN UR ELPH-MCNC: 3.2 GM/DL
GLUCOSE SERPL-MCNC: 98 MG/DL (ref 65–99)
GLUCOSE UR STRIP-MCNC: NEGATIVE MG/DL
HCT VFR BLD AUTO: 41.9 % (ref 34–46.6)
HGB BLD-MCNC: 13.6 G/DL (ref 12–15.9)
HGB UR QL STRIP.AUTO: NEGATIVE
HYALINE CASTS UR QL AUTO: NORMAL /LPF
IMM GRANULOCYTES # BLD AUTO: 0.02 10*3/MM3 (ref 0–0.05)
IMM GRANULOCYTES NFR BLD AUTO: 0.3 % (ref 0–0.5)
KETONES UR QL STRIP: NEGATIVE
LEUKOCYTE ESTERASE UR QL STRIP.AUTO: ABNORMAL
LIPASE SERPL-CCNC: 68 U/L (ref 13–60)
LYMPHOCYTES # BLD AUTO: 1.78 10*3/MM3 (ref 0.7–3.1)
LYMPHOCYTES NFR BLD AUTO: 24.1 % (ref 19.6–45.3)
MCH RBC QN AUTO: 31.1 PG (ref 26.6–33)
MCHC RBC AUTO-ENTMCNC: 32.5 G/DL (ref 31.5–35.7)
MCV RBC AUTO: 95.7 FL (ref 79–97)
MONOCYTES # BLD AUTO: 0.53 10*3/MM3 (ref 0.1–0.9)
MONOCYTES NFR BLD AUTO: 7.2 % (ref 5–12)
NEUTROPHILS NFR BLD AUTO: 4.96 10*3/MM3 (ref 1.7–7)
NEUTROPHILS NFR BLD AUTO: 67.1 % (ref 42.7–76)
NITRITE UR QL STRIP: NEGATIVE
NRBC BLD AUTO-RTO: 0 /100 WBC (ref 0–0.2)
NT-PROBNP SERPL-MCNC: 51.2 PG/ML (ref 0–900)
PH UR STRIP.AUTO: 6.5 [PH] (ref 5–8)
PLATELET # BLD AUTO: 435 10*3/MM3 (ref 140–450)
PMV BLD AUTO: 8.7 FL (ref 6–12)
POTASSIUM SERPL-SCNC: 3.7 MMOL/L (ref 3.5–5.2)
PROT SERPL-MCNC: 7.9 G/DL (ref 6–8.5)
PROT UR QL STRIP: NEGATIVE
RBC # BLD AUTO: 4.38 10*6/MM3 (ref 3.77–5.28)
RBC # UR: NORMAL /HPF
REF LAB TEST METHOD: NORMAL
SARS-COV-2 RNA PNL SPEC NAA+PROBE: NOT DETECTED
SODIUM SERPL-SCNC: 144 MMOL/L (ref 136–145)
SP GR UR STRIP: 1.01 (ref 1–1.03)
SQUAMOUS #/AREA URNS HPF: NORMAL /HPF
TROPONIN T SERPL-MCNC: <0.01 NG/ML (ref 0–0.03)
UROBILINOGEN UR QL STRIP: ABNORMAL
WBC # BLD AUTO: 7.39 10*3/MM3 (ref 3.4–10.8)
WBC UR QL AUTO: NORMAL /HPF

## 2021-10-03 PROCEDURE — 78226 HEPATOBILIARY SYSTEM IMAGING: CPT

## 2021-10-03 PROCEDURE — G0378 HOSPITAL OBSERVATION PER HR: HCPCS

## 2021-10-03 PROCEDURE — C9803 HOPD COVID-19 SPEC COLLECT: HCPCS

## 2021-10-03 PROCEDURE — 80053 COMPREHEN METABOLIC PANEL: CPT | Performed by: EMERGENCY MEDICINE

## 2021-10-03 PROCEDURE — 25010000002 HYDROMORPHONE PER 4 MG: Performed by: EMERGENCY MEDICINE

## 2021-10-03 PROCEDURE — 93010 ELECTROCARDIOGRAM REPORT: CPT | Performed by: INTERNAL MEDICINE

## 2021-10-03 PROCEDURE — 25010000002 DROPERIDOL PER 5 MG: Performed by: EMERGENCY MEDICINE

## 2021-10-03 PROCEDURE — 84484 ASSAY OF TROPONIN QUANT: CPT | Performed by: EMERGENCY MEDICINE

## 2021-10-03 PROCEDURE — 0 TECHNETIUM TC 99M MEBROFENIN KIT: Performed by: EMERGENCY MEDICINE

## 2021-10-03 PROCEDURE — 81001 URINALYSIS AUTO W/SCOPE: CPT | Performed by: EMERGENCY MEDICINE

## 2021-10-03 PROCEDURE — 99284 EMERGENCY DEPT VISIT MOD MDM: CPT

## 2021-10-03 PROCEDURE — 71275 CT ANGIOGRAPHY CHEST: CPT

## 2021-10-03 PROCEDURE — 85025 COMPLETE CBC W/AUTO DIFF WBC: CPT | Performed by: EMERGENCY MEDICINE

## 2021-10-03 PROCEDURE — 83880 ASSAY OF NATRIURETIC PEPTIDE: CPT | Performed by: EMERGENCY MEDICINE

## 2021-10-03 PROCEDURE — 85379 FIBRIN DEGRADATION QUANT: CPT | Performed by: EMERGENCY MEDICINE

## 2021-10-03 PROCEDURE — 96375 TX/PRO/DX INJ NEW DRUG ADDON: CPT

## 2021-10-03 PROCEDURE — 96374 THER/PROPH/DIAG INJ IV PUSH: CPT

## 2021-10-03 PROCEDURE — 0 IOPAMIDOL PER 1 ML: Performed by: EMERGENCY MEDICINE

## 2021-10-03 PROCEDURE — 87635 SARS-COV-2 COVID-19 AMP PRB: CPT | Performed by: EMERGENCY MEDICINE

## 2021-10-03 PROCEDURE — A9537 TC99M MEBROFENIN: HCPCS | Performed by: EMERGENCY MEDICINE

## 2021-10-03 PROCEDURE — 25010000002 LORAZEPAM PER 2 MG: Performed by: EMERGENCY MEDICINE

## 2021-10-03 PROCEDURE — 83690 ASSAY OF LIPASE: CPT | Performed by: EMERGENCY MEDICINE

## 2021-10-03 PROCEDURE — 93005 ELECTROCARDIOGRAM TRACING: CPT | Performed by: EMERGENCY MEDICINE

## 2021-10-03 RX ORDER — DROPERIDOL 2.5 MG/ML
2.5 INJECTION, SOLUTION INTRAMUSCULAR; INTRAVENOUS ONCE
Status: COMPLETED | OUTPATIENT
Start: 2021-10-03 | End: 2021-10-03

## 2021-10-03 RX ORDER — FAMOTIDINE 20 MG/1
20 TABLET, FILM COATED ORAL 2 TIMES DAILY
Status: ON HOLD | COMMUNITY
End: 2021-10-04

## 2021-10-03 RX ORDER — LORAZEPAM 2 MG/ML
0.5 INJECTION INTRAMUSCULAR ONCE
Status: COMPLETED | OUTPATIENT
Start: 2021-10-03 | End: 2021-10-03

## 2021-10-03 RX ORDER — SUCRALFATE 1 G/1
1 TABLET ORAL 4 TIMES DAILY
COMMUNITY
End: 2021-10-06 | Stop reason: HOSPADM

## 2021-10-03 RX ORDER — ONDANSETRON 4 MG/1
4 TABLET, ORALLY DISINTEGRATING ORAL EVERY 8 HOURS PRN
COMMUNITY
End: 2021-10-14 | Stop reason: SDUPTHER

## 2021-10-03 RX ORDER — HYDROCODONE BITARTRATE AND ACETAMINOPHEN 5; 325 MG/1; MG/1
1 TABLET ORAL EVERY 4 HOURS PRN
Status: DISCONTINUED | OUTPATIENT
Start: 2021-10-03 | End: 2021-10-06 | Stop reason: HOSPADM

## 2021-10-03 RX ORDER — HYDROMORPHONE HYDROCHLORIDE 1 MG/ML
0.5 INJECTION, SOLUTION INTRAMUSCULAR; INTRAVENOUS; SUBCUTANEOUS ONCE
Status: COMPLETED | OUTPATIENT
Start: 2021-10-03 | End: 2021-10-03

## 2021-10-03 RX ORDER — SODIUM CHLORIDE 0.9 % (FLUSH) 0.9 %
10 SYRINGE (ML) INJECTION EVERY 12 HOURS SCHEDULED
Status: DISCONTINUED | OUTPATIENT
Start: 2021-10-04 | End: 2021-10-06 | Stop reason: HOSPADM

## 2021-10-03 RX ORDER — SODIUM CHLORIDE 0.9 % (FLUSH) 0.9 %
10 SYRINGE (ML) INJECTION AS NEEDED
Status: DISCONTINUED | OUTPATIENT
Start: 2021-10-03 | End: 2021-10-06 | Stop reason: HOSPADM

## 2021-10-03 RX ORDER — METOCLOPRAMIDE 10 MG/1
10 TABLET ORAL
COMMUNITY
End: 2021-10-06 | Stop reason: HOSPADM

## 2021-10-03 RX ORDER — PANTOPRAZOLE SODIUM 40 MG/1
40 TABLET, DELAYED RELEASE ORAL DAILY
Status: ON HOLD | COMMUNITY
End: 2021-10-04

## 2021-10-03 RX ORDER — ONDANSETRON 2 MG/ML
4 INJECTION INTRAMUSCULAR; INTRAVENOUS EVERY 6 HOURS PRN
Status: DISCONTINUED | OUTPATIENT
Start: 2021-10-03 | End: 2021-10-06 | Stop reason: HOSPADM

## 2021-10-03 RX ORDER — KIT FOR THE PREPARATION OF TECHNETIUM TC 99M MEBROFENIN 45 MG/10ML
1 INJECTION, POWDER, LYOPHILIZED, FOR SOLUTION INTRAVENOUS
Status: COMPLETED | OUTPATIENT
Start: 2021-10-03 | End: 2021-10-03

## 2021-10-03 RX ORDER — MORPHINE SULFATE 2 MG/ML
1 INJECTION, SOLUTION INTRAMUSCULAR; INTRAVENOUS EVERY 4 HOURS PRN
Status: DISCONTINUED | OUTPATIENT
Start: 2021-10-03 | End: 2021-10-06 | Stop reason: HOSPADM

## 2021-10-03 RX ORDER — NALOXONE HCL 0.4 MG/ML
0.4 VIAL (ML) INJECTION
Status: DISCONTINUED | OUTPATIENT
Start: 2021-10-03 | End: 2021-10-06 | Stop reason: HOSPADM

## 2021-10-03 RX ORDER — ACETAMINOPHEN 325 MG/1
650 TABLET ORAL EVERY 4 HOURS PRN
Status: DISCONTINUED | OUTPATIENT
Start: 2021-10-03 | End: 2021-10-06 | Stop reason: HOSPADM

## 2021-10-03 RX ORDER — ONDANSETRON 4 MG/1
4 TABLET, FILM COATED ORAL EVERY 6 HOURS PRN
Status: DISCONTINUED | OUTPATIENT
Start: 2021-10-03 | End: 2021-10-06 | Stop reason: HOSPADM

## 2021-10-03 RX ORDER — SODIUM CHLORIDE 9 MG/ML
100 INJECTION, SOLUTION INTRAVENOUS CONTINUOUS
Status: DISCONTINUED | OUTPATIENT
Start: 2021-10-04 | End: 2021-10-06 | Stop reason: HOSPADM

## 2021-10-03 RX ADMIN — MEBROFENIN 1 DOSE: 45 INJECTION, POWDER, LYOPHILIZED, FOR SOLUTION INTRAVENOUS at 20:45

## 2021-10-03 RX ADMIN — HYDROMORPHONE HYDROCHLORIDE 0.5 MG: 1 INJECTION, SOLUTION INTRAMUSCULAR; INTRAVENOUS; SUBCUTANEOUS at 18:06

## 2021-10-03 RX ADMIN — DROPERIDOL 2.5 MG: 2.5 INJECTION, SOLUTION INTRAMUSCULAR; INTRAVENOUS at 18:07

## 2021-10-03 RX ADMIN — IOPAMIDOL 90 ML: 755 INJECTION, SOLUTION INTRAVENOUS at 22:14

## 2021-10-03 RX ADMIN — LORAZEPAM 0.5 MG: 2 INJECTION INTRAMUSCULAR; INTRAVENOUS at 20:22

## 2021-10-03 NOTE — ED PROVIDER NOTES
EMERGENCY DEPARTMENT ENCOUNTER    Room Number:  35/35  Date of encounter:  10/3/2021  PCP: Provider, No Known  Historian: Patient      HPI:  Chief Complaint: Abdominal pain, nausea  A complete HPI/ROS/PMH/PSH/SH/FH are unobtainable due to: Anxiety    Context: Mary Jensen is a 51 y.o. female who presents to the ED c/o diffuse abdominal pain with severe nausea.  Her symptoms started suddenly last night around 8 PM and have been constant.  There are no aggravating or alleviating factors, however the patient is very nauseated.  She has not been attempting to eat.  Last bowel movement was today-nonbloody.  No fevers or chills.    Recently with similar symptoms-see discussion below.      The patient was placed in a mask in triage, hand hygiene was performed before and after my interaction with the patient.  I wore a mask, safety glasses and gloves during my entire interaction with the patient.    PAST MEDICAL HISTORY  Active Ambulatory Problems     Diagnosis Date Noted   • Calculus of gallbladder with cholecystitis without biliary obstruction 09/25/2021     Resolved Ambulatory Problems     Diagnosis Date Noted   • No Resolved Ambulatory Problems     No Additional Past Medical History         PAST SURGICAL HISTORY  Past Surgical History:   Procedure Laterality Date   • CHOLECYSTECTOMY WITH INTRAOPERATIVE CHOLANGIOGRAM N/A 9/25/2021    Procedure: Laparoscopic cholecystectomy;  Surgeon: Leno Segura MD;  Location: [facility];  Service: General;  Laterality: N/A;         FAMILY HISTORY  History reviewed. No pertinent family history.      SOCIAL HISTORY  Social History     Socioeconomic History   • Marital status:      Spouse name: Not on file   • Number of children: Not on file   • Years of education: Not on file   • Highest education level: Not on file   Tobacco Use   • Smoking status: Never Smoker   • Smokeless tobacco: Never Used   Vaping Use   • Vaping Use: Never used         ALLERGIES  Patient has no  known allergies.        REVIEW OF SYSTEMS  Review of Systems   Reason unable to perform ROS: She is very anxious.   Constitutional: Negative for chills and fever.   Respiratory: Positive for shortness of breath.    Gastrointestinal: Positive for abdominal pain and nausea. Negative for diarrhea and vomiting.        All systems reviewed and negative except for those discussed in HPI.       PHYSICAL EXAM    I have reviewed the triage vital signs and nursing notes.    ED Triage Vitals   Temp Heart Rate Resp BP SpO2   10/03/21 1710 10/03/21 1710 10/03/21 1710 10/03/21 1712 10/03/21 1710   98.7 °F (37.1 °C) (!) 129 20 119/75 99 %      Temp src Heart Rate Source Patient Position BP Location FiO2 (%)   -- -- -- -- --              Physical Exam   Constitutional: Pt. is awake and alert.  She is hyperventilating, tearful and very anxious, bordering on histrionic.   HENT: Normocephalic and atraumatic.  Neck: Normal range of motion. Neck supple. No JVD present.   Cardiovascular: Normal rate, regular rhythm and normal heart sounds. Exam reveals no gallop and no friction rub.   No murmur heard.  Pulmonary/Chest: Effort normal and breath sounds normal. No stridor. No respiratory distress. No wheezes, no rales.   Abdominal: When distracted, she has minimal pain-mostly in the left upper quadrant.  There is no guarding or rebound.    Musculoskeletal: Normal range of motion. No edema, tenderness or deformity.   Neurological: Pt. is alert and oriented to person, place, and time.  She has no focal neurologic deficits  Skin: Skin is warm and dry. No rash noted. Pt. is not diaphoretic. No erythema.   Psychiatric: Mood, affect and judgment normal.  She is pleasant and cooperative.  Nursing note and vitals reviewed.        LAB RESULTS  Recent Results (from the past 24 hour(s))   Comprehensive Metabolic Panel    Collection Time: 10/03/21  6:05 PM    Specimen: Blood   Result Value Ref Range    Glucose 98 65 - 99 mg/dL    BUN 8 6 - 20 mg/dL     Creatinine 0.77 0.57 - 1.00 mg/dL    Sodium 144 136 - 145 mmol/L    Potassium 3.7 3.5 - 5.2 mmol/L    Chloride 108 (H) 98 - 107 mmol/L    CO2 23.9 22.0 - 29.0 mmol/L    Calcium 9.7 8.6 - 10.5 mg/dL    Total Protein 7.9 6.0 - 8.5 g/dL    Albumin 4.70 3.50 - 5.20 g/dL    ALT (SGPT) 15 1 - 33 U/L    AST (SGOT) 13 1 - 32 U/L    Alkaline Phosphatase 102 39 - 117 U/L    Total Bilirubin 0.2 0.0 - 1.2 mg/dL    eGFR Non African Amer 78 >60 mL/min/1.73    Globulin 3.2 gm/dL    A/G Ratio 1.5 g/dL    BUN/Creatinine Ratio 10.4 7.0 - 25.0    Anion Gap 12.1 5.0 - 15.0 mmol/L   Lipase    Collection Time: 10/03/21  6:05 PM    Specimen: Blood   Result Value Ref Range    Lipase 68 (H) 13 - 60 U/L   Urinalysis With Microscopic If Indicated (No Culture) - Urine, Clean Catch    Collection Time: 10/03/21  6:05 PM    Specimen: Urine, Clean Catch   Result Value Ref Range    Color, UA Yellow Yellow, Straw    Appearance, UA Clear Clear    pH, UA 6.5 5.0 - 8.0    Specific Gravity, UA 1.010 1.005 - 1.030    Glucose, UA Negative Negative    Ketones, UA Negative Negative    Bilirubin, UA Negative Negative    Blood, UA Negative Negative    Protein, UA Negative Negative    Leuk Esterase, UA Small (1+) (A) Negative    Nitrite, UA Negative Negative    Urobilinogen, UA 0.2 E.U./dL 0.2 - 1.0 E.U./dL   CBC Auto Differential    Collection Time: 10/03/21  6:05 PM    Specimen: Blood   Result Value Ref Range    WBC 7.39 3.40 - 10.80 10*3/mm3    RBC 4.38 3.77 - 5.28 10*6/mm3    Hemoglobin 13.6 12.0 - 15.9 g/dL    Hematocrit 41.9 34.0 - 46.6 %    MCV 95.7 79.0 - 97.0 fL    MCH 31.1 26.6 - 33.0 pg    MCHC 32.5 31.5 - 35.7 g/dL    RDW 12.3 12.3 - 15.4 %    RDW-SD 43.6 37.0 - 54.0 fl    MPV 8.7 6.0 - 12.0 fL    Platelets 435 140 - 450 10*3/mm3    Neutrophil % 67.1 42.7 - 76.0 %    Lymphocyte % 24.1 19.6 - 45.3 %    Monocyte % 7.2 5.0 - 12.0 %    Eosinophil % 0.8 0.3 - 6.2 %    Basophil % 0.5 0.0 - 1.5 %    Immature Grans % 0.3 0.0 - 0.5 %    Neutrophils,  Absolute 4.96 1.70 - 7.00 10*3/mm3    Lymphocytes, Absolute 1.78 0.70 - 3.10 10*3/mm3    Monocytes, Absolute 0.53 0.10 - 0.90 10*3/mm3    Eosinophils, Absolute 0.06 0.00 - 0.40 10*3/mm3    Basophils, Absolute 0.04 0.00 - 0.20 10*3/mm3    Immature Grans, Absolute 0.02 0.00 - 0.05 10*3/mm3    nRBC 0.0 0.0 - 0.2 /100 WBC   Urinalysis, Microscopic Only - Urine, Clean Catch    Collection Time: 10/03/21  6:05 PM    Specimen: Urine, Clean Catch   Result Value Ref Range    RBC, UA 0-2 None Seen, 0-2 /HPF    WBC, UA 0-2 None Seen, 0-2 /HPF    Bacteria, UA None Seen None Seen /HPF    Squamous Epithelial Cells, UA 0-2 None Seen, 0-2 /HPF    Hyaline Casts, UA 0-2 None Seen /LPF    Methodology Automated Microscopy    Troponin    Collection Time: 10/03/21  6:05 PM    Specimen: Blood   Result Value Ref Range    Troponin T <0.010 0.000 - 0.030 ng/mL   BNP    Collection Time: 10/03/21  6:05 PM    Specimen: Blood   Result Value Ref Range    proBNP 51.2 0.0 - 900.0 pg/mL   ECG 12 Lead    Collection Time: 10/03/21  8:11 PM   Result Value Ref Range    QT Interval 362 ms   D-dimer, Quantitative    Collection Time: 10/03/21  8:22 PM    Specimen: Blood   Result Value Ref Range    D-Dimer, Quantitative 1.21 (H) 0.00 - 0.49 MCGFEU/mL       Ordered the above labs and independently reviewed the results.        RADIOLOGY  NM Hepatobiliary Without CCK    Result Date: 10/3/2021  HIDA SCAN  HISTORY: Nausea and vomiting following cholecystectomy  COMPARISON: 10/01/2021  TECHNIQUE: Patient was administered 5.7 mCi of technetium 99m Choletec. Sequential images were obtained.  FINDINGS: The patient is noted to have prompt and homogeneous radiotracer uptake within the liver. There is prompt excretion into the common bile duct, followed by excretion into the duodenum. I see no evidence of bile leak.      No evidence of bile leak.  This report was finalized on 10/3/2021 9:54 PM by Dr. Kira Rogers M.D.      CT Angiogram Chest    Result Date:  10/3/2021  CT ANGIOGRAM OF THE CHEST  HISTORY: Chest pain  COMPARISON: 09/25/2021  TECHNIQUE: Axial CT imaging was obtained through the thorax. IV contrast was administered. Three-D reformatted images were obtained.  FINDINGS: No acute pulmonary thromboembolus is seen. The thoracic aorta is normal in caliber. There is no evidence of dissection. There is separate origin of the left vertebral artery from the aortic arch. There is a common origin of the brachycephalic artery and left common carotid artery. No definite coronary artery calcifications are seen. There is no pleural or pericardial effusion. The thyroid gland, trachea, and esophagus appear unremarkable. No acute infiltrates are seen. Mediastinal lymph nodes do not appear pathologically large. Patient is status post cholecystectomy. There is some mild stranding which is noted within the right upper quadrant and the operative bed. Appearance is very similar to the prior exam. This likely represents normal postoperative change. Discrete drainable fluid collection is not seen. Inflammation around the duodenal bulb appears improved when compared to prior study. Pancreas is normal. There is no biliary dilatation. No acute osseous abnormalities are seen.       1. No acute intrathoracic findings. 2. Mild stranding noted within the operative bed, without organized fluid collection seen. Similar findings were present on prior study.  Radiation dose reduction techniques were utilized, including automated exposure control and exposure modulation based on body size.  This report was finalized on 10/3/2021 10:56 PM by Dr. Kira Rogers M.D.        I ordered the above noted radiological studies. Reviewed by me and discussed with radiologist.  See dictation for official radiology interpretation.      PROCEDURES    Procedures      MEDICATIONS GIVEN IN ER    Medications   sodium chloride 0.9 % flush 10 mL (has no administration in time range)   HYDROmorphone (DILAUDID)  injection 0.5 mg (0.5 mg Intravenous Given 10/3/21 1806)   droperidol (INAPSINE) injection 2.5 mg (2.5 mg Intravenous Given 10/3/21 1807)   LORazepam (ATIVAN) injection 0.5 mg (0.5 mg Intravenous Given 10/3/21 2022)   technetium Tc 99m mebrofenin (CHOLETEC) injection 1 dose (1 dose Intravenous Given 10/3/21 2045)   iopamidol (ISOVUE-370) 76 % injection 100 mL (90 mL Intravenous Given by Other 10/3/21 2214)         PROGRESS, DATA ANALYSIS, CONSULTS, AND MEDICAL DECISION MAKING    Any/all labs have been independently reviewed by me.  Any/all radiology studies have been reviewed by me and discussed with radiologist dictating the report.   EKG's independently viewed and interpreted by me.  Discussion below represents my analysis of pertinent findings related to patient's condition, differential diagnosis, treatment plan and final disposition.      ED Course as of Oct 03 1917   Sun Oct 03, 2021   1715 Prior record review-patient was admitted late last month for uneventful laparoscopic cholecystectomy.  The cystic duct for cholangiogram was not able to be performed, however she did well overnight and was discharged feeling reasonably well.  She presented to the ER 2 days ago for nausea and vomiting and epigastric pain.  Labs and CT of the abdomen pelvis were unremarkable.    [WC]   1745 On initial evaluation, the patient was very anxious and almost histrionic.  Calming voice and reassurance were provided.  Will recheck labs.  Have ordered hydromorphone IM and have seen for her pain.    [WC]   1819 CBC is normal.    [WC]   1835 Very slightly elevated.   Lipase(!): 68 [WC]   1835 ALT (SGPT): 15 [WC]   1835 AST (SGOT): 13 [WC]   1835 Alkaline Phosphatase: 102 [WC]   1842 Heart rate down to 91.  Blood pressure 138/94.    [WC]   1900 Case discussed with Dr. Pepe Eng (general surgery)-he recommends ordering a HIDA scan to evaluate for biliary leak.  If this cannot be performed tonight, he recommends admitting the patient  to Dr. Segura service for symptom control and to get HIDA obtained in the morning.    [WC]      ED Course User Index  [WC] Wood Kam MD       AS OF 23:01 EDT VITALS:    BP - 124/77  HR - 98  TEMP - 98.7 °F (37.1 °C)  02 SATS - 100%        DIAGNOSIS  Final diagnoses:   Epigastric pain   Nausea   History of cholecystectomy   Atypical chest pain         DISPOSITION  Observation-MedSur           Wood Kam MD  10/03/21 1918       Wood Kam MD  10/03/21 2302       Wood Kam MD  10/03/21 2302

## 2021-10-03 NOTE — ED NOTES
I wore full protective equipment throughout this patient encounter including a face mask, goggles, and gloves. Hand hygiene was performed before donning protective equipment and after removal when leaving the room.       Arti Martinez RN  10/03/21 9143

## 2021-10-03 NOTE — TELEPHONE ENCOUNTER
"10/01/2021- she was seen in Ed for nausea and vomiting-  She was discharged from [facility]- on 09/26/2021- with  Gallbladder surgery.  They have talked to the surgeon during this. She is having nausea and  pain in chest and jaw. Advised to go to the Ed. Unsure if caller will do this.     Reason for Disposition  • [1] Chest pain lasts > 5 minutes AND [2] age > 44    Additional Information  • Negative: SEVERE difficulty breathing (e.g., struggling for each breath, speaks in single words)  • Negative: Difficult to awaken or acting confused (e.g., disoriented, slurred speech)  • Negative: Shock suspected (e.g., cold/pale/clammy skin, too weak to stand, low BP, rapid pulse)  • Negative: Passed out (i.e., fainted, collapsed and was not responding)    Answer Assessment - Initial Assessment Questions  1. LOCATION: \"Where does it hurt?\"        Chest pain and jaw   2. RADIATION: \"Does the pain go anywhere else?\" (e.g., into neck, jaw, arms, back)      It goes to the jaw   3. ONSET: \"When did the chest pain begin?\" (Minutes, hours or days)       She has been having Cp   4. PATTERN \"Does the pain come and go, or has it been constant since it started?\"  \"Does it get worse with exertion?\"       It comes and goes   5. DURATION: \"How long does it last\" (e.g., seconds, minutes, hours)      Minutes.   6. SEVERITY: \"How bad is the pain?\"  (e.g., Scale 1-10; mild, moderate, or severe)     - MILD (1-3): doesn't interfere with normal activities      - MODERATE (4-7): interferes with normal activities or awakens from sleep     - SEVERE (8-10): excruciating pain, unable to do any normal activities        Moderate   7. CARDIAC RISK FACTORS: \"Do you have any history of heart problems or risk factors for heart disease?\" (e.g., angina, prior heart attack; diabetes, high blood pressure, high cholesterol, smoker, or strong family history of heart disease)      no  8. PULMONARY RISK FACTORS: \"Do you have any history of lung disease?\"  (e.g., blood " "clots in lung, asthma, emphysema, birth control pills)      no  9. CAUSE: \"What do you think is causing the chest pain?\"      Stomach   10. OTHER SYMPTOMS: \"Do you have any other symptoms?\" (e.g., dizziness, nausea, vomiting, sweating, fever, difficulty breathing, cough)        Nausea   11. PREGNANCY: \"Is there any chance you are pregnant?\" \"When was your last menstrual period?\"        n    Protocols used: CHEST PAIN-ADULT-AH      "

## 2021-10-03 NOTE — ED TRIAGE NOTES
Pt reports she had her gallbladder removed on Saturday. Pt reports severe abd pain and nausea that has become worse since her surgery. Pt reports she has been seen here 4 times for the same symptoms

## 2021-10-04 ENCOUNTER — ANESTHESIA EVENT (OUTPATIENT)
Dept: GASTROENTEROLOGY | Facility: HOSPITAL | Age: 51
End: 2021-10-04

## 2021-10-04 ENCOUNTER — ANESTHESIA (OUTPATIENT)
Dept: GASTROENTEROLOGY | Facility: HOSPITAL | Age: 51
End: 2021-10-04

## 2021-10-04 PROBLEM — R11.0 NAUSEA: Status: ACTIVE | Noted: 2021-10-04

## 2021-10-04 LAB
GIE STN SPEC: NORMAL
QT INTERVAL: 362 MS

## 2021-10-04 PROCEDURE — 25010000002 ONDANSETRON PER 1 MG: Performed by: EMERGENCY MEDICINE

## 2021-10-04 PROCEDURE — 25010000002 ONDANSETRON PER 1 MG: Performed by: SURGERY

## 2021-10-04 PROCEDURE — 87206 SMEAR FLUORESCENT/ACID STAI: CPT | Performed by: INTERNAL MEDICINE

## 2021-10-04 PROCEDURE — G0378 HOSPITAL OBSERVATION PER HR: HCPCS

## 2021-10-04 PROCEDURE — 88305 TISSUE EXAM BY PATHOLOGIST: CPT | Performed by: INTERNAL MEDICINE

## 2021-10-04 PROCEDURE — 87081 CULTURE SCREEN ONLY: CPT | Performed by: INTERNAL MEDICINE

## 2021-10-04 PROCEDURE — 25010000002 PROPOFOL 10 MG/ML EMULSION: Performed by: NURSE ANESTHETIST, CERTIFIED REGISTERED

## 2021-10-04 PROCEDURE — 25010000002 METOCLOPRAMIDE PER 10 MG: Performed by: NURSE PRACTITIONER

## 2021-10-04 PROCEDURE — 99204 OFFICE O/P NEW MOD 45 MIN: CPT | Performed by: INTERNAL MEDICINE

## 2021-10-04 PROCEDURE — 43239 EGD BIOPSY SINGLE/MULTIPLE: CPT | Performed by: INTERNAL MEDICINE

## 2021-10-04 RX ORDER — SUCRALFATE 1 G/1
1 TABLET ORAL 4 TIMES DAILY
Status: DISCONTINUED | OUTPATIENT
Start: 2021-10-04 | End: 2021-10-06 | Stop reason: HOSPADM

## 2021-10-04 RX ORDER — PANTOPRAZOLE SODIUM 40 MG/1
40 TABLET, DELAYED RELEASE ORAL EVERY MORNING
Status: DISCONTINUED | OUTPATIENT
Start: 2021-10-04 | End: 2021-10-04

## 2021-10-04 RX ORDER — LIDOCAINE HYDROCHLORIDE 20 MG/ML
INJECTION, SOLUTION INFILTRATION; PERINEURAL AS NEEDED
Status: DISCONTINUED | OUTPATIENT
Start: 2021-10-04 | End: 2021-10-04 | Stop reason: SURG

## 2021-10-04 RX ORDER — SODIUM CHLORIDE, SODIUM LACTATE, POTASSIUM CHLORIDE, CALCIUM CHLORIDE 600; 310; 30; 20 MG/100ML; MG/100ML; MG/100ML; MG/100ML
30 INJECTION, SOLUTION INTRAVENOUS CONTINUOUS
Status: DISCONTINUED | OUTPATIENT
Start: 2021-10-04 | End: 2021-10-05

## 2021-10-04 RX ORDER — PROMETHAZINE HYDROCHLORIDE 25 MG/1
25 SUPPOSITORY RECTAL EVERY 6 HOURS PRN
Status: DISCONTINUED | OUTPATIENT
Start: 2021-10-04 | End: 2021-10-05 | Stop reason: SDUPTHER

## 2021-10-04 RX ORDER — METOCLOPRAMIDE 10 MG/1
10 TABLET ORAL
Status: DISCONTINUED | OUTPATIENT
Start: 2021-10-04 | End: 2021-10-04

## 2021-10-04 RX ORDER — METOCLOPRAMIDE HYDROCHLORIDE 5 MG/ML
5 INJECTION INTRAMUSCULAR; INTRAVENOUS ONCE
Status: DISCONTINUED | OUTPATIENT
Start: 2021-10-04 | End: 2021-10-04

## 2021-10-04 RX ORDER — ONDANSETRON 4 MG/1
4 TABLET, ORALLY DISINTEGRATING ORAL EVERY 12 HOURS PRN
Status: DISCONTINUED | OUTPATIENT
Start: 2021-10-04 | End: 2021-10-06 | Stop reason: HOSPADM

## 2021-10-04 RX ORDER — SCOLOPAMINE TRANSDERMAL SYSTEM 1 MG/1
1 PATCH, EXTENDED RELEASE TRANSDERMAL
Status: DISCONTINUED | OUTPATIENT
Start: 2021-10-04 | End: 2021-10-06 | Stop reason: HOSPADM

## 2021-10-04 RX ORDER — SODIUM CHLORIDE 9 MG/ML
100 INJECTION, SOLUTION INTRAVENOUS CONTINUOUS
Status: DISCONTINUED | OUTPATIENT
Start: 2021-10-04 | End: 2021-10-04

## 2021-10-04 RX ORDER — PROMETHAZINE HYDROCHLORIDE 25 MG/1
25 SUPPOSITORY RECTAL ONCE
Status: COMPLETED | OUTPATIENT
Start: 2021-10-04 | End: 2021-10-04

## 2021-10-04 RX ORDER — SODIUM CHLORIDE 9 MG/ML
30 INJECTION, SOLUTION INTRAVENOUS CONTINUOUS PRN
Status: DISCONTINUED | OUTPATIENT
Start: 2021-10-04 | End: 2021-10-06 | Stop reason: HOSPADM

## 2021-10-04 RX ORDER — PROMETHAZINE HYDROCHLORIDE 25 MG/1
25 TABLET ORAL ONCE AS NEEDED
Status: DISCONTINUED | OUTPATIENT
Start: 2021-10-04 | End: 2021-10-04 | Stop reason: HOSPADM

## 2021-10-04 RX ORDER — PROMETHAZINE HYDROCHLORIDE 25 MG/1
25 SUPPOSITORY RECTAL ONCE AS NEEDED
Status: DISCONTINUED | OUTPATIENT
Start: 2021-10-04 | End: 2021-10-04 | Stop reason: HOSPADM

## 2021-10-04 RX ORDER — PROPOFOL 10 MG/ML
VIAL (ML) INTRAVENOUS AS NEEDED
Status: DISCONTINUED | OUTPATIENT
Start: 2021-10-04 | End: 2021-10-04 | Stop reason: SURG

## 2021-10-04 RX ORDER — PANTOPRAZOLE SODIUM 40 MG/10ML
40 INJECTION, POWDER, LYOPHILIZED, FOR SOLUTION INTRAVENOUS
Status: DISCONTINUED | OUTPATIENT
Start: 2021-10-04 | End: 2021-10-06 | Stop reason: HOSPADM

## 2021-10-04 RX ORDER — METOCLOPRAMIDE HYDROCHLORIDE 5 MG/ML
5 INJECTION INTRAMUSCULAR; INTRAVENOUS ONCE
Status: COMPLETED | OUTPATIENT
Start: 2021-10-04 | End: 2021-10-04

## 2021-10-04 RX ADMIN — ACETAMINOPHEN 650 MG: 325 TABLET, FILM COATED ORAL at 23:09

## 2021-10-04 RX ADMIN — SCOLOPAMINE TRANSDERMAL SYSTEM 1 PATCH: 1 PATCH, EXTENDED RELEASE TRANSDERMAL at 14:32

## 2021-10-04 RX ADMIN — PROPOFOL 30 MG: 10 INJECTION, EMULSION INTRAVENOUS at 13:37

## 2021-10-04 RX ADMIN — SODIUM CHLORIDE, PRESERVATIVE FREE 10 ML: 5 INJECTION INTRAVENOUS at 01:02

## 2021-10-04 RX ADMIN — SODIUM CHLORIDE, PRESERVATIVE FREE 10 ML: 5 INJECTION INTRAVENOUS at 08:35

## 2021-10-04 RX ADMIN — PROPOFOL 20 MG: 10 INJECTION, EMULSION INTRAVENOUS at 13:44

## 2021-10-04 RX ADMIN — ONDANSETRON 4 MG: 2 INJECTION INTRAMUSCULAR; INTRAVENOUS at 03:09

## 2021-10-04 RX ADMIN — PROPOFOL 100 MG: 10 INJECTION, EMULSION INTRAVENOUS at 13:33

## 2021-10-04 RX ADMIN — SUCRALFATE 1 G: 1 TABLET ORAL at 21:33

## 2021-10-04 RX ADMIN — SUCRALFATE 1 G: 1 TABLET ORAL at 09:38

## 2021-10-04 RX ADMIN — PROMETHAZINE HYDROCHLORIDE 25 MG: 25 SUPPOSITORY RECTAL at 19:55

## 2021-10-04 RX ADMIN — LIDOCAINE HYDROCHLORIDE 100 MG: 20 INJECTION, SOLUTION INFILTRATION; PERINEURAL at 13:33

## 2021-10-04 RX ADMIN — ONDANSETRON 4 MG: 2 INJECTION INTRAMUSCULAR; INTRAVENOUS at 23:09

## 2021-10-04 RX ADMIN — PANTOPRAZOLE SODIUM 40 MG: 40 INJECTION, POWDER, FOR SOLUTION INTRAVENOUS at 08:35

## 2021-10-04 RX ADMIN — SODIUM CHLORIDE 30 ML/HR: 9 INJECTION, SOLUTION INTRAVENOUS at 12:59

## 2021-10-04 RX ADMIN — PROMETHAZINE HYDROCHLORIDE 25 MG: 25 SUPPOSITORY RECTAL at 18:07

## 2021-10-04 RX ADMIN — ACETAMINOPHEN 650 MG: 325 TABLET, FILM COATED ORAL at 00:29

## 2021-10-04 RX ADMIN — METOCLOPRAMIDE HYDROCHLORIDE 5 MG: 5 INJECTION INTRAMUSCULAR; INTRAVENOUS at 06:49

## 2021-10-04 RX ADMIN — SODIUM CHLORIDE, PRESERVATIVE FREE 10 ML: 5 INJECTION INTRAVENOUS at 21:33

## 2021-10-04 RX ADMIN — PROPOFOL 30 MG: 10 INJECTION, EMULSION INTRAVENOUS at 13:41

## 2021-10-04 RX ADMIN — PROMETHAZINE HYDROCHLORIDE 25 MG: 25 SUPPOSITORY RECTAL at 09:21

## 2021-10-04 RX ADMIN — SODIUM CHLORIDE 100 ML/HR: 9 INJECTION, SOLUTION INTRAVENOUS at 01:02

## 2021-10-04 RX ADMIN — SODIUM CHLORIDE, POTASSIUM CHLORIDE, SODIUM LACTATE AND CALCIUM CHLORIDE 30 ML/HR: 600; 310; 30; 20 INJECTION, SOLUTION INTRAVENOUS at 14:55

## 2021-10-04 NOTE — PLAN OF CARE
Goal Outcome Evaluation:      Pt hasn't complained of any pain, nausea or vomiting since arriving to the unit. Currently NPO incase surgery is needed, sinus ben on the monitor, vital signs stable.

## 2021-10-04 NOTE — ANESTHESIA POSTPROCEDURE EVALUATION
"Patient: Mary Jensen    Procedure Summary     Date: 10/04/21 Room / [facility]:  [facility]    Anesthesia Start: 1305 Anesthesia Stop: 1355    Procedure: ESOPHAGOGASTRODUODENOSCOPY (N/A Esophagus) Diagnosis:       Epigastric pain      Nausea      (Epigastric pain [R10.13])      (Nausea [R11.0])    Surgeons: J Carlos Francis MD Provider: Adryan Byrd MD    Anesthesia Type: MAC ASA Status: 2          Anesthesia Type: MAC    Vitals  Vitals Value Taken Time   /73 10/04/21 1412   Temp     Pulse 79 10/04/21 1412   Resp 16 10/04/21 1412   SpO2 99 % 10/04/21 1412           Post Anesthesia Care and Evaluation    Patient location during evaluation: PACU  Patient participation: complete - patient participated  Level of consciousness: awake  Pain score: 0  Pain management: adequate  Airway patency: patent  Anesthetic complications: No anesthetic complications  PONV Status: none  Cardiovascular status: acceptable  Respiratory status: acceptable  Hydration status: acceptable    Comments: /73 (BP Location: Left arm, Patient Position: Lying)   Pulse 79   Temp 36.7 °C (98.1 °F) (Oral)   Resp 16   Ht 162.6 cm (64\")   Wt 53.1 kg (117 lb)   LMP  (LMP Unknown)   SpO2 99%   Breastfeeding No   BMI 20.08 kg/m²       "

## 2021-10-04 NOTE — CASE MANAGEMENT/SOCIAL WORK
Discharge Planning Assessment  [facility]     Patient Name: Mary Jensen  MRN: [id number]  Today's Date: 10/4/2021    Admit Date: 10/3/2021    Discharge Needs Assessment    No documentation.       Discharge Plan     Row Name 10/04/21 1732       Plan    Plan Comments  Entered room, introduced self and explained role- patient evaluated for intractable nausea- had EGD done today- patient is currently in no acute distress, ambulatory without assistance and independent w/ADL's- provided patient w/[facility] patient connection hub card and verified contact number to call to schedule an appt. as she currently is without a PCP; awaiting admission to the floor at this time for further care; Family or friend will provide transport home upon d/c. Continue to follow for any further needs. Nella Jolley RN          Continued Care and Services - Admitted Since 10/3/2021    Coordination has not been started for this encounter.         Demographic Summary     Row Name 10/04/21 1727       General Information    Admission Type  observation    Arrived From  home    Reason for Consult  discharge planning    Preferred Language  English     Used During This Interaction  no       Contact Information    Permission Granted to Share Info With          Functional Status     Row Name 10/04/21 1733       Functional Status    Usual Activity Tolerance  excellent    Current Activity Tolerance  excellent       Functional Status, IADL    Medications  independent    Meal Preparation  independent    Housekeeping  independent    Laundry  independent    Shopping  independent    IADL Comments  patient is independent w/ADL's       Mental Status    General Appearance WDL  WDL       Mental Status Summary    Recent Changes in Mental Status/Cognitive Functioning  no changes        Psychosocial    No documentation.       Abuse/Neglect    No documentation.       Legal    No documentation.       Substance Abuse    No documentation.        Patient Forms    No documentation.           Nella Jolley RN

## 2021-10-04 NOTE — PROGRESS NOTES
[facility]     Progress Note    Patient Name: Mary Jensen  : 1970  MRN: [id number]  Primary Care Physician:  Provider, No Known  Date of admission: 10/3/2021    Subjective   Subjective     Chief Complaint: nausea    HPI:  Patient Reports persistent nausea since cholecystectomy on . She had IV Zofran last night but became nauseated before it was time for another dose, so was given IV Reglan by night APRN without relief. No complaints of chest pain or shortness of breath, no abdominal pain at this time.     Review of Systems   All systems were reviewed and negative except for: HPI    Objective   Objective     Vitals:   Temp:  [97.9 °F (36.6 °C)-98.7 °F (37.1 °C)] 98.1 °F (36.7 °C)  Heart Rate:  [] 64  Resp:  [16-20] 16  BP: ()/(64-77) 97/64  Flow (L/min):  [2] 2  Physical Exam    Constitutional: Awake, alert   Eyes: PERRLA, sclerae anicteric, no conjunctival injection   HENT: NCAT, mucous membranes moist   Neck: Supple, no thyromegaly, no lymphadenopathy, trachea midline   Respiratory: Clear to auscultation bilaterally, nonlabored respirations    Cardiovascular: RRR, no murmurs, rubs, or gallops, palpable pedal pulses bilaterally   Gastrointestinal: Positive bowel sounds, soft, nontender, nondistended   Musculoskeletal: No bilateral ankle edema, no clubbing or cyanosis to extremities   Psychiatric: Appropriate affect, cooperative   Neurologic: Oriented x 3, strength symmetric in all extremities, Cranial Nerves grossly intact to confrontation, speech clear   Skin: No rashes     Result Review    Result Review:  I have personally reviewed the results from the time of this admission to 10/4/2021 08:41 EDT and agree with these findings:  [x]  Laboratory  []  Microbiology  [x]  Radiology  []  EKG/Telemetry   []  Cardiology/Vascular   []  Pathology  []  Old records  []  Other:  Most notable findings include:     Assessment/Plan   Assessment / Plan     Brief Patient Summary:  Mary Jensen is a 51  y.o. female who presents with epigastric pain and persistent nausea after cholecystectomy. She reports the nausea is improved with IV Zofran while in the hospital, but when she goes home and takes oral Zofran or Phenergan, she is unable to find relief. She reports the pain is under control and in fact, hasn't even taken any pain medication since she has been here. She is requesting additional medication for her nausea and it was decided that we try a phenergan suppository since the oral medications hadn't worked well for her in the past. Patient reports that she did have epigastric pain after surgery, though workup in ED was negative and she denies any of this pain at this time. General surgery saw patient in consult and agreed with plan for continued symptom management. Will await GI consult for further recommendations.      Active Hospital Problems:  Active Hospital Problems    Diagnosis    • Epigastric pain      Plan:   Epigastric pain/nausea  -pain doesn't seem to be her issue at this point but nausea persists  -no relief with oral antiemetics so we will try ID Phenergan at this time to see if this is something that will work for her at home  -GI consult pending    DVT prophylaxis:  Mechanical DVT prophylaxis orders are present.    CODE STATUS:   Level Of Support Discussed With: Patient  Code Status: CPR  Medical Interventions (Level of Support Prior to Arrest): Full    Disposition:  I expect patient to be discharged today.    Electronically signed by YOSELIN Meza, 10/04/21, 8:41 AM EDT.

## 2021-10-04 NOTE — PROGRESS NOTES
The KAMILAH and I have discussed this patient's history, physical exam, and treatment plan. I have reviewed the documentation and personally had a face to face interaction with the patient  I affirm the documentation and agree with the treatment and plan.  The following describes my personal findings.    The patient presents complaining of persistent nausea, epigastric discomfort over the past 5 days, not relieved with Zofran, p.o. Phenergan at home.  Patient reports nausea is debilitating, unable to tolerate p.o.  Denies vomiting, fever, significant pain.      Limited physical exam:  Patient is nontoxic appearing mild discomfort, conversant, oriented  Lungs/cardiovascular: Nontachypneic, nontachycardic  Abdomen: Soft, positive bowel sounds  Back/extremities: Patient ambulatory in the ER, good range of motion, pulse x4 extremities, without edema      Plan:  Abdominal pain   Patient denies pain this morning  CT abdomen pelvis, negative for acute disease  HIDA scan negative for acute disease     Nausea    General surgery consult-seen by Dr Leno Segura he feels the patient does not have ongoing surgical issue at this time.  GI consult-patient seen by Dr. Bates this morning and is to be n.p.o. with plan for an EGD today for further evaluation and treatment  Patient reports her nausea is much improved post DC Phenergan  IVF      Patient was wearing facemask when I entered the room and throughout our encounter. Full protective equipment was worn throughout this patient encounter including a face mask, eye protection and gloves. Hand hygiene was performed before donning protective equipment and after removal when leaving the room.

## 2021-10-04 NOTE — CONSULTS
Cc: Abdominal pain, nausea     History of presenting illness:   This is a  51-year-old relatively healthy female who is about 9 days out from a laparoscopic cholecystectomy.  She had actually presented to the emergency room twice over the course of about a week.  Her initial visit really did not show anything, but her follow-up visit eventually prompted an ultrasound which suggested stones.  There was suggestion of some possible mild wall thickening on CT and she was taken for laparoscopic cholecystectomy which proceeded uneventfully and the patient was discharged home the next day feeling well.  She did well for about 48 hours postop, but then began having severe nausea and the patient states that the severity of the nausea was causing pain.  The pain is in the epigastrium.  It is associated with reflux symptoms and heaving and vomiting.  She presented to the emergency department about 3 days ago, was worked up with no findings and discharged home.  She returned again last night with recurrent symptoms, saying that her nausea and pain are intractable.  This morning she says she does not feel much better.  She says she still cannot eat anything.  Work-up is essentially negative objectively.     Past Medical History: Denies     Past Surgical History:  Laparoscopic cholecystectomy 9/25/2021     Medications: None chronically, although recently she has been on Carafate, proton pump inhibitor and Zofran for symptomatic management     Allergies: None known     Social History: Non-smoker, she is active     Family History: Negative for known colon and rectal cancer, her mother did suffer from gallbladder disease     Review of Systems:  Constitutional: Positive for decreased appetite, negative for fever or chills  Neck: no swollen glands or dysphagia or odynophagia  Respiratory: negative for SOB, cough, hemoptysis or wheezing  Cardiovascular: negative for chest pain, palpitations or peripheral edema  Gastrointestinal:  Positive for abdominal pain nausea, bloating        Physical Exam:  BMI: 20.0  Temperature 98.1 heart rate 129 at admission, 61 currently, respiration 16 breaths/min, blood pressure 97/64  General: alert and oriented, appropriate, appears mildly anxious  Eyes: No scleral icterus, extraocular movements are intact  Neck: Supple without lymphadenopathy or thyromegaly, trachea is in the midline  Respiratory: There is good bilateral chest expansion, no use of accessory muscles is noted  Cardiovascular: No jugular venous distention or peripheral edema is seen  Gastrointestinal: Soft and nondistended.  There is minimal objective tenderness.  Incisions are in good order.     Laboratory data:  Labs are reviewed.  White blood cell count 7.4 hemoglobin 13.6 platelets 435.  There is no left shift.  Chemistries are largely unremarkable.  Total bilirubin normal at 0.2.  Albumin is 4.7.  Creatinine normal.  Lipase very mildly elevated at 68, it was normal at 53 at her last ER visit last week.     Imaging data:  CT abdomen pelvis and CT chest done over the last couple of days both essentially negative which is normal postop findings.  No fluid collection.  HIDA scan done is also negative for evidence of bile leak.        Assessment and plan:   -Nausea, vomiting, abdominal pain, etiology unclear  -No evidence of surgical complication at this point  -Continue symptomatic management  -Not much more to be offered surgically I do not believe, I will ask GI to see and get their opinion to see if there is anything to be offered from medical side of things.        Leno Segura MD, FACS  General, Minimally Invasive and Endoscopic Surgery  [facility]     [address], [address]  [place], [place], [address]  P: [phone]  F: [phone]

## 2021-10-04 NOTE — CONSULTS
[facility], [place], operated by [facility]  Initial Inpatient Consult Note    Referring Provider: Dr Segura    Reason for Consultation: Persistent nausea    Subjective     History of present illness:      Thank you for requesting my opinion.    51-year-old woman who developed nausea approximately 3 weeks ago.  She denies any precipitants including no illnesses, no sick contacts, no medication, nor diet changes.  She reports that the nausea has been constant.  There has been no associated vomiting.  However the nausea has been so severe at times that it has led to significant discomfort.  She was treated for sinus infection.  She was seen at urgent care.  She was put on PPI.  She denies that any of these medications gave her any relief.  Prior to this time, she was taking ibuprofen on a pretty regular basis but says that she has not for about the past month.  This actually led to an ER visit, note was made that she had some gallstones and on September 25, she had a cholecystectomy.  She is recovered pretty well from that but still has had fairly incapacitating nausea.  She has used Zofran and Phenergan at home, every 8 hours alternating without any relief.  She says only thing that gives relief is when she gets IV antinausea medicines and then she feels good for about 24 hours before the symptoms recur.  She is not on any outpatient medications on a regular basis.  She has not had any other surgeries prior to her gallbladder.  She is not a smoker.  She is not drink alcohol excessively.  She works as a healthcare .    There is no associated constipation, no diarrhea.  She has been able to take in liquids and has not become dehydrated.  She feels really full and bloated when she eats food and subsequently has not eaten a great deal over the past couple of weeks.  She has no history of diabetes no family history of diabetes.  She has not been on opiods.        Past Medical History:  History reviewed. No pertinent past  medical history.    Past Surgical History:  Past Surgical History:   Procedure Laterality Date   • CHOLECYSTECTOMY WITH INTRAOPERATIVE CHOLANGIOGRAM N/A 9/25/2021    Procedure: Laparoscopic cholecystectomy;  Surgeon: Leno Segura MD;  Location: [facility] OR;  Service: General;  Laterality: N/A;        Social History:   Social History     Tobacco Use   • Smoking status: Never Smoker   • Smokeless tobacco: Never Used   Substance Use Topics   • Alcohol use: Not on file        Family History:  History reviewed. No pertinent family history.    Home Meds:  Medications Prior to Admission   Medication Sig Dispense Refill Last Dose   • metoclopramide (REGLAN) 10 MG tablet Take 10 mg by mouth 4 (Four) Times a Day Before Meals & at Bedtime.   10/3/2021 at Unknown time   • omeprazole (priLOSEC) 40 MG capsule Take 40 mg by mouth Daily.   Past Month at Unknown time   • ondansetron ODT (ZOFRAN-ODT) 4 MG disintegrating tablet Place 4 mg on the tongue Every 8 (Eight) Hours As Needed for Nausea or Vomiting.   Past Week at Unknown time   • sucralfate (CARAFATE) 1 g tablet Take 1 g by mouth 4 (Four) Times a Day.   Past Month at Unknown time       Current Meds:   pantoprazole, 40 mg, Intravenous, Q AM  Scopolamine, 1 patch, Transdermal, Q72H  sodium chloride, 10 mL, Intravenous, Q12H  sucralfate, 1 g, Oral, 4x Daily        Allergies:  No Known Allergies    Review of Systems  All systems were reviewed and negative except for:  Gastrointestinal: positive for  nausea     Objective     Vital Signs  Temp:  [97.9 °F (36.6 °C)-98.7 °F (37.1 °C)] 98.1 °F (36.7 °C)  Heart Rate:  [] 64  Resp:  [16-20] 16  BP: ()/(64-77) 97/64    Physical Exam:  Constitutional:   Alert, cooperative, in no acute distress, appears stated age   Eyes:           Lids and lashes normal, conjunctivae and sclerae normal,   no icterus   Ears, nose, mouth and throat:  Normal appearance of external ears and nose, no oral l  lesions, no thrush, oral mucosa  moist   Respiratory:    Clear to auscultation, respirations regular, even and             unlabored    Cardiovascular:   Regular rhythm and normal rate, normal S1 and S2, no        murmur, no gallop, palpable distal pulses, no lower extremity edema   Gastrointestinal:    Soft, nondistended, nontender to palpation, no guarding, no rebound tenderness, normal bowel sounds, no palpable masses or organomegaly  Rectal exam: deferred   Musculoskeletal:  Normal station, no atrophy, no tenderness to palpation, normal digits and nails   Skin:  Normal color, no bleeding, bruising, rashes or lesions   Lymphatics:  No palpable cervical or supraclavicular adenopathy   Psychiatric:  Judgement and insight: normal   Orientation to person, place and time: normal   Mood and affect: normal       Results Review:   I reviewed the patient's new clinical results.    Results from last 7 days   Lab Units 10/03/21  1805 10/01/21  1704   WBC 10*3/mm3 7.39 7.70   HEMOGLOBIN g/dL 13.6 14.7   HEMATOCRIT % 41.9 46.1   PLATELETS 10*3/mm3 435 470*       Results from last 7 days   Lab Units 10/03/21  1805 10/01/21  1704   SODIUM mmol/L 144 139   POTASSIUM mmol/L 3.7 3.9   CHLORIDE mmol/L 108* 102   CO2 mmol/L 23.9 23.4   BUN mg/dL 8 12   CREATININE mg/dL 0.77 0.82   CALCIUM mg/dL 9.7 10.2   BILIRUBIN mg/dL 0.2 0.3   ALK PHOS U/L 102 112   ALT (SGPT) U/L 15 21   AST (SGOT) U/L 13 17   GLUCOSE mg/dL 98 96             Lab Results   Lab Value Date/Time    LIPASE 68 (H) 10/03/2021 1805    LIPASE 53 10/01/2021 1704    LIPASE 45 09/25/2021 1002    LIPASE 34 09/22/2021 1120       Radiology:  Imaging Results (Last 72 Hours)     Procedure Component Value Units Date/Time    CT Angiogram Chest [[id number]] Collected: 10/03/21 2249     Updated: 10/03/21 2259    Narrative:      CT ANGIOGRAM OF THE CHEST     HISTORY: Chest pain     COMPARISON: 09/25/2021     TECHNIQUE: Axial CT imaging was obtained through the thorax. IV contrast  was administered. Three-D  reformatted images were obtained.      FINDINGS:  No acute pulmonary thromboembolus is seen. The thoracic aorta is normal  in caliber. There is no evidence of dissection. There is separate origin  of the left vertebral artery from the aortic arch. There is a common  origin of the brachycephalic artery and left common carotid artery. No  definite coronary artery calcifications are seen. There is no pleural or  pericardial effusion. The thyroid gland, trachea, and esophagus appear  unremarkable. No acute infiltrates are seen. Mediastinal lymph nodes do  not appear pathologically large. Patient is status post cholecystectomy.  There is some mild stranding which is noted within the right upper  quadrant and the operative bed. Appearance is very similar to the prior  exam. This likely represents normal postoperative change. Discrete  drainable fluid collection is not seen. Inflammation around the duodenal  bulb appears improved when compared to prior study. Pancreas is normal.  There is no biliary dilatation. No acute osseous abnormalities are seen.       Impression:         1. No acute intrathoracic findings.  2. Mild stranding noted within the operative bed, without organized  fluid collection seen. Similar findings were present on prior study.     Radiation dose reduction techniques were utilized, including automated  exposure control and exposure modulation based on body size.     This report was finalized on 10/3/2021 10:56 PM by Dr. Kira Rogers M.D.       NM Hepatobiliary Without CCK [[id number]] Collected: 10/03/21 2152     Updated: 10/03/21 2158    Narrative:      HIDA SCAN     HISTORY: Nausea and vomiting following cholecystectomy     COMPARISON: 10/01/2021     TECHNIQUE: Patient was administered 5.7 mCi of technetium 99m Choletec.  Sequential images were obtained.     FINDINGS:  The patient is noted to have prompt and homogeneous radiotracer uptake  within the liver. There is prompt excretion into the  common bile duct,  followed by excretion into the duodenum. I see no evidence of bile leak.       Impression:      No evidence of bile leak.      This report was finalized on 10/3/2021 9:54 PM by Dr. Kira Rogers M.D.             Assessment/Plan       Nausea    Epigastric pain      Impression  1.  Nausea: Intractable, no vomiting.  No relief after cholecystectomy.  No relief with oral antiemetics.  Quite debilitating.    2.  Upper abdominal pain: With above    3.  Recent cholecystectomy    Plan  EGD today for further evaluation of her symptoms  Trial of scopolamine patch  Discussed that she may need a gastric emptying study for further evaluation    I discussed the patients findings and my recommendations with patient, nursing staff and consulting provider    All necessary PPE, including face mask and eye protection, were worn during this encounter.  Hand sanitization was performed both before and after the patient interaction.    Jessica Lenz MD  [facility]      Dictated utilizing Dragon dictation

## 2021-10-04 NOTE — NURSING NOTE
Pt went for an EGD, Dr. Lenz stated that if the EGD did not show anything the patient would need a gastic emptying study. A gastric emptying study was ordered, but nuc med stated they strictly only do these in the morning because of the  they use. Pt requests to stay and have this done since she does not have a PCP at this time. Therefore, the patient will be staying over the 24hr period.

## 2021-10-04 NOTE — H&P
" [facility]   HISTORY AND PHYSICAL    Patient Name: Mary Jensen  : 1970  MRN: [id number]  Primary Care Physician:  Provider, No Known  Date of admission: 10/3/2021    Subjective   Subjective     Chief Complaint: Nausea and abdominal pain    HPI:    Mary Jensen is a 51 y.o. female who presented to [facility] complaining of nausea and abdominal pain.  States she has had her gallbladder taken out by Dr. Segura on  and was discharged home the next day.  States she was not in any pain and did not have any nausea until Wednesday morning when she started experiencing severe nausea without abdominal pain.  States she ended up in the ER on Friday for nausea.  States she was given Zofran and she felt better and was discharged home.  Patient reports that about 2100 she started having lower abdominal pain that radiates up into  epigastric region, right chest and into her right shoulder and lower back.  States the pain was severe and described as\" gas cramping\".  Patient reports that nothing improve or worsen her pain.  Currently denies any abdominal pain except left upper quadrant tenderness with palpitation.  States she has been belching and constantly passing gas.  States she has not been able to tolerate any food except for some soup and some Jell-O.  Denies diarrhea, vomiting, chest pain, shortness of breath, dizziness, headache, and denies any edema to lower extremities.   Review of Systems   All systems were reviewed and negative except for: nausea and abd pain     Personal History     History reviewed. No pertinent past medical history.    Past Surgical History:   Procedure Laterality Date   • CHOLECYSTECTOMY WITH INTRAOPERATIVE CHOLANGIOGRAM N/A 2021    Procedure: Laparoscopic cholecystectomy;  Surgeon: Leno Segura MD;  Location: [facility];  Service: General;  Laterality: N/A;       Family History: family history is not on file. Otherwise pertinent FHx " was reviewed and not pertinent to current issue.    Social History:  reports that she has never smoked. She has never used smokeless tobacco.    Home Medications:  famotidine, metoclopramide, omeprazole, ondansetron ODT, pantoprazole, and sucralfate    Allergies:  No Known Allergies    Objective   Objective     Vitals:   Temp:  [98.7 °F (37.1 °C)] 98.7 °F (37.1 °C)  Heart Rate:  [] 98  Resp:  [20] 20  BP: (119-136)/(68-77) 124/77  Flow (L/min):  [2] 2  Physical Exam    Constitutional: Awake,  alert   Eyes: PERRLA, sclerae anicteric, no conjunctival injection   HENT: NCAT, mucous membranes moist   Neck: Supple, no thyromegaly, no lymphadenopathy, trachea midline   Respiratory: Clear to auscultation bilaterally, nonlabored respirations    Cardiovascular: RRR, no murmurs, rubs, or gallops, palpable pedal pulses bilaterally   Gastrointestinal: hypoactive bowel sounds, soft, left upper quadrant tenderness, nondistended   Musculoskeletal: No bilateral ankle edema, no clubbing or cyanosis to extremities              Endocrine: Negative    Psychiatric: Appropriate affect, cooperative   Neurologic: Oriented x 3, strength symmetric in all extremities, Cranial Nerves grossly intact to confrontation, speech clear   Skin: No rashes     Result Review    Result Review:  I have personally reviewed the results from the time of this admission to 10/4/2021 00:17 EDT and agree with these findings:  [x]  Laboratory  []  Microbiology  [x]  Radiology  [x]  EKG/Telemetry   []  Cardiology/Vascular   []  Pathology  []  Old records  []  Other:  Most notable findings include:   Lipase a 68  D-dimer 1.21  Troponin  0.010  CTA of chest shows no acute intrathoracic findings.  Mild stranding noted with the operative bed without organized fluid collection.   HIDA scan reveals no bile duct leak      Assessment/Plan   Assessment / Plan     Brief Patient Summary:  Mary Jensen is a 51 y.o. female  who presented to [facility] complaining of  abdominal pain and nausea.  Patient status post cholecystectomy that was performed on 25 of September and since the 29 September, patient has developed severe nausea.  Patient reports that the nausea was so severe that she was seen in the ER at [facility] on Friday, was medicated and sent home.  Patient returned to the ER tonight due to severe nausea and lower abdominal pain that radiates into the epigastric region, right chest, and right shoulder/ lower back.  Currently patient denies any abdominal pain.   Active Hospital Problems:  Active Hospital Problems    Diagnosis    • Epigastric pain      Plan:   Abdominal pain   PRN Norco/morphine     Nausea    General surgery consult  PRN zofran   IVF    DVT prophylaxis:  Mechanical DVT prophylaxis orders are present.    CODE STATUS:    Level Of Support Discussed With: Patient  Code Status: CPR  Medical Interventions (Level of Support Prior to Arrest): Full    Admission Status:  I believe this patient meets observation status.    Electronically signed by YOSELIN Sanchez, 10/04/21, 12:17 AM EDT.

## 2021-10-04 NOTE — PROGRESS NOTES
Patient updated on plan of care and has decided that she wants to stay in observation unit in order to have her gastric emptying study done in the morning.

## 2021-10-04 NOTE — PROGRESS NOTES
Clinical Pharmacy Services: Medication History    Mary Jensen is a 51 y.o. female presenting to [facility] for Epigastric pain [R10.13]    She  has no past medical history on file.    Allergies as of 10/03/2021   • (No Known Allergies)       Medication information was obtained from: Patient  Pharmacy and Phone Number: [facility] [address] [place], [place] - [address] [address] [phone] [facility] [phone] FX        Prior to Admission Medications     Prescriptions Last Dose Informant Patient Reported? Taking?    famotidine (PEPCID) 20 MG tablet 10/3/2021 Self Yes Yes    Take 20 mg by mouth 2 (Two) Times a Day.    metoclopramide (REGLAN) 10 MG tablet 10/3/2021 Self Yes Yes    Take 10 mg by mouth 4 (Four) Times a Day Before Meals & at Bedtime.    omeprazole (priLOSEC) 40 MG capsule Past Month Self Yes Yes    Take 40 mg by mouth Daily.    ondansetron ODT (ZOFRAN-ODT) 4 MG disintegrating tablet Past Week Self Yes Yes    Place 4 mg on the tongue Every 8 (Eight) Hours As Needed for Nausea or Vomiting.    pantoprazole (PROTONIX) 40 MG EC tablet 10/3/2021 Self Yes Yes    Take 40 mg by mouth Daily.    sucralfate (CARAFATE) 1 g tablet Past Month Self Yes Yes    Take 1 g by mouth 4 (Four) Times a Day.            Medication notes: Patient reports being prescribed both omeprazole and pantoprazole.  She reports that her MD told her to take both.      This medication list is complete to the best of my knowledge as of 10/3/2021    Please call if questions.    Armand Pena CP  10/3/2021 20:11 EDT

## 2021-10-05 ENCOUNTER — APPOINTMENT (OUTPATIENT)
Dept: NUCLEAR MEDICINE | Facility: HOSPITAL | Age: 51
End: 2021-10-05

## 2021-10-05 PROBLEM — F41.9 ANXIETY DISORDER: Status: ACTIVE | Noted: 2021-10-05

## 2021-10-05 LAB
GLUCOSE BLDC GLUCOMTR-MCNC: 90 MG/DL (ref 70–130)
LAB AP CASE REPORT: NORMAL
PATH REPORT.FINAL DX SPEC: NORMAL
PATH REPORT.GROSS SPEC: NORMAL

## 2021-10-05 PROCEDURE — 99214 OFFICE O/P EST MOD 30 MIN: CPT | Performed by: INTERNAL MEDICINE

## 2021-10-05 PROCEDURE — 25010000002 PROCHLORPERAZINE 10 MG/2ML SOLUTION: Performed by: NURSE PRACTITIONER

## 2021-10-05 PROCEDURE — 96375 TX/PRO/DX INJ NEW DRUG ADDON: CPT

## 2021-10-05 PROCEDURE — 99024 POSTOP FOLLOW-UP VISIT: CPT | Performed by: SURGERY

## 2021-10-05 PROCEDURE — G0378 HOSPITAL OBSERVATION PER HR: HCPCS

## 2021-10-05 PROCEDURE — 25010000002 ONDANSETRON PER 1 MG: Performed by: SURGERY

## 2021-10-05 PROCEDURE — 94799 UNLISTED PULMONARY SVC/PX: CPT

## 2021-10-05 PROCEDURE — 0 TECHNETIUM SULFUR COLLOID: Performed by: SURGERY

## 2021-10-05 PROCEDURE — 82962 GLUCOSE BLOOD TEST: CPT

## 2021-10-05 PROCEDURE — A9541 TC99M SULFUR COLLOID: HCPCS | Performed by: SURGERY

## 2021-10-05 PROCEDURE — 78264 GASTRIC EMPTYING IMG STUDY: CPT

## 2021-10-05 RX ORDER — HYDROXYZINE HYDROCHLORIDE 25 MG/1
25 TABLET, FILM COATED ORAL 3 TIMES DAILY PRN
Status: DISCONTINUED | OUTPATIENT
Start: 2021-10-05 | End: 2021-10-06 | Stop reason: HOSPADM

## 2021-10-05 RX ORDER — PROMETHAZINE HYDROCHLORIDE 12.5 MG/1
12.5 SUPPOSITORY RECTAL EVERY 8 HOURS PRN
Status: DISCONTINUED | OUTPATIENT
Start: 2021-10-05 | End: 2021-10-06 | Stop reason: HOSPADM

## 2021-10-05 RX ORDER — ERYTHROMYCIN 250 MG/1
250 TABLET, COATED ORAL
Status: DISCONTINUED | OUTPATIENT
Start: 2021-10-05 | End: 2021-10-06 | Stop reason: HOSPADM

## 2021-10-05 RX ORDER — HYDROXYZINE HYDROCHLORIDE 25 MG/1
25 TABLET, FILM COATED ORAL 3 TIMES DAILY PRN
Status: DISCONTINUED | OUTPATIENT
Start: 2021-10-05 | End: 2021-10-05

## 2021-10-05 RX ORDER — LORAZEPAM 0.5 MG/1
0.5 TABLET ORAL EVERY 6 HOURS PRN
Status: DISCONTINUED | OUTPATIENT
Start: 2021-10-05 | End: 2021-10-06 | Stop reason: HOSPADM

## 2021-10-05 RX ORDER — PROCHLORPERAZINE EDISYLATE 5 MG/ML
10 INJECTION INTRAMUSCULAR; INTRAVENOUS EVERY 6 HOURS PRN
Status: DISCONTINUED | OUTPATIENT
Start: 2021-10-05 | End: 2021-10-06 | Stop reason: HOSPADM

## 2021-10-05 RX ADMIN — PANTOPRAZOLE SODIUM 40 MG: 40 INJECTION, POWDER, FOR SOLUTION INTRAVENOUS at 05:13

## 2021-10-05 RX ADMIN — HYDROXYZINE HYDROCHLORIDE 25 MG: 25 TABLET ORAL at 12:49

## 2021-10-05 RX ADMIN — SUCRALFATE 1 G: 1 TABLET ORAL at 16:50

## 2021-10-05 RX ADMIN — PROCHLORPERAZINE EDISYLATE 10 MG: 5 INJECTION INTRAMUSCULAR; INTRAVENOUS at 14:45

## 2021-10-05 RX ADMIN — ERYTHROMYCIN 250 MG: 250 TABLET, FILM COATED ORAL at 16:51

## 2021-10-05 RX ADMIN — SODIUM CHLORIDE 100 ML/HR: 9 INJECTION, SOLUTION INTRAVENOUS at 18:19

## 2021-10-05 RX ADMIN — SUCRALFATE 1 G: 1 TABLET ORAL at 21:19

## 2021-10-05 RX ADMIN — ONDANSETRON 4 MG: 2 INJECTION INTRAMUSCULAR; INTRAVENOUS at 05:13

## 2021-10-05 RX ADMIN — LORAZEPAM 0.5 MG: 0.5 TABLET ORAL at 15:14

## 2021-10-05 RX ADMIN — PROMETHAZINE HYDROCHLORIDE 12.5 MG: 12.5 SUPPOSITORY RECTAL at 12:49

## 2021-10-05 RX ADMIN — TECHNETIUM TC 99M SULFUR COLLOID 1 DOSE: KIT at 08:11

## 2021-10-05 RX ADMIN — LORAZEPAM 0.5 MG: 0.5 TABLET ORAL at 23:19

## 2021-10-05 NOTE — CONSULTS
Patient Name:  Mary Jensen  YOB: 1970  MRN:  [id number]  Date of Admission:  10/3/2021  Date of Consult:  10/5/2021  Patient Care Team:  Provider, No Known as PCP - General    Inpatient Hospitalist Consult  Consult performed by: Ros Laurent APRN  Consult ordered by: Alexis Dean MD        Subjective   History of Present Illness  Ms. Jensen is a 51 y.o. female that has been admitted to [facility] following elective ESOPHAGOGASTRODUODENOSCOPY with biopsies, and brushing.  She has been admitted to a medical surgical floor following EGD secondary to intractable nausea and abdominal pain and we were asked to see and assist with her medical problems, specifically relating to her anxiety. Symptoms began about a month ago and have progressively worsened. She had to put her dog down then panic attacks began. She is not currently on any maintenance medication for anxiety. Her daughter is also in the process of filling out college applications in which is exacerbating her anxiety as she is her only child. She has followed with a therapist in the past but has not been back as she developed GI symptoms in which has kept her at home and led to cholecystectomy. She recently had her gallbladder removed and since then has had pretty significant nausea resulting in a few trips to the ED. She has tried zofran and phenergan without relief and was also placed on a PPI.       History reviewed. No pertinent past medical history.  Past Surgical History:   Procedure Laterality Date   • CHOLECYSTECTOMY WITH INTRAOPERATIVE CHOLANGIOGRAM N/A 9/25/2021    Procedure: Laparoscopic cholecystectomy;  Surgeon: Leno Segura MD;  Location: [facility] MAIN OR;  Service: General;  Laterality: N/A;   • ENDOSCOPY N/A 10/4/2021    Procedure: ESOPHAGOGASTRODUODENOSCOPY with biopsies, and brushing;  Surgeon: J Carlos Francis MD;  Location: [facility] ENDOSCOPY;  Service: Gastroenterology;  Laterality:  N/A;  pre: PERSISTENT NAUSEA  AND VOMITING  post:  normal   • SINUS SURGERY       Family History   Family history unknown: Yes     Social History     Tobacco Use   • Smoking status: Never Smoker   • Smokeless tobacco: Never Used   Vaping Use   • Vaping Use: Never used   Substance Use Topics   • Alcohol use: Never   • Drug use: Never     Medications Prior to Admission   Medication Sig Dispense Refill Last Dose   • metoclopramide (REGLAN) 10 MG tablet Take 10 mg by mouth 4 (Four) Times a Day Before Meals & at Bedtime.   10/3/2021 at Unknown time   • omeprazole (priLOSEC) 40 MG capsule Take 40 mg by mouth Daily.   Past Month at Unknown time   • ondansetron ODT (ZOFRAN-ODT) 4 MG disintegrating tablet Place 4 mg on the tongue Every 8 (Eight) Hours As Needed for Nausea or Vomiting.   Past Week at Unknown time   • sucralfate (CARAFATE) 1 g tablet Take 1 g by mouth 4 (Four) Times a Day.   Past Month at Unknown time     Allergies:  Patient has no known allergies.    Review of Systems   Constitutional: Positive for activity change and appetite change. Negative for fever.   HENT: Negative for congestion and sore throat.    Eyes: Negative for discharge and itching.   Respiratory: Negative for chest tightness and shortness of breath.    Cardiovascular: Negative for chest pain and palpitations.   Gastrointestinal: Positive for abdominal pain, nausea and vomiting.   Endocrine: Negative for cold intolerance and heat intolerance.   Genitourinary: Negative for difficulty urinating and dysuria.   Musculoskeletal: Negative for back pain and gait problem.   Skin: Negative for color change and pallor.   Allergic/Immunologic: Negative for environmental allergies and food allergies.   Neurological: Negative for headaches.   Hematological: Negative for adenopathy. Does not bruise/bleed easily.   Psychiatric/Behavioral: Negative for agitation and behavioral problems.       Objective      Vital Signs  Temp:  [97 °F (36.1 °C)-98.2 °F (36.8  °C)] 97 °F (36.1 °C)  Heart Rate:  [58-94] 82  Resp:  [16-36] 36  BP: ()/(56-82) 105/63  Body mass index is 19.89 kg/m².    Physical Exam  Vitals and nursing note reviewed.   Constitutional:       General: She is in acute distress.   HENT:      Head: Normocephalic and atraumatic.   Eyes:      Extraocular Movements: Extraocular movements intact.      Conjunctiva/sclera: Conjunctivae normal.   Cardiovascular:      Rate and Rhythm: Normal rate and regular rhythm.   Pulmonary:      Effort: Pulmonary effort is normal. No respiratory distress.      Breath sounds: Normal breath sounds.   Abdominal:      General: Bowel sounds are normal. There is no distension.      Palpations: Abdomen is soft.      Tenderness: There is no abdominal tenderness.   Musculoskeletal:         General: No swelling. Normal range of motion.      Cervical back: Normal range of motion and neck supple.   Skin:     General: Skin is warm and dry.   Neurological:      Mental Status: She is alert and oriented to person, place, and time. Mental status is at baseline.   Psychiatric:         Mood and Affect: Mood is anxious.         Thought Content: Thought content normal.         Results Review:   I reviewed the patient's new clinical results.  I reviewed the patient's new imaging results and agree with the interpretation.  I reviewed the patient's other test results and agree with the interpretation         Assessment/Plan     Active Hospital Problems    Diagnosis  POA   • **Nausea [R11.0]  Yes   • Anxiety disorder [F41.9]  Unknown   • Epigastric pain [R10.13]  Yes      Resolved Hospital Problems   No resolved problems to display.       Ms. Jensen is a 51 y.o. female who is s/p ESOPHAGOGASTRODUODENOSCOPY with biopsies, and brushing.    · really believe her physical symptoms are leading to increased anxiety and panic attacks. we will add PRN hydroxyzine for anxiety and have recommended following up with a PCP for daily maintenance and continue to see  a psychiatrist.         Thank you very much for asking A to be involved in this patient's care. We will follow along with you.    ADDENDUM: got pages from nursing she had another panic attack. access will be consulted and she will be given ativan in hopes to calm her nerves.       YOSELIN De La Rosa  [facility]  10/05/21  11:12 EDT

## 2021-10-05 NOTE — PROGRESS NOTES
Gastroenterology   Inpatient Progress Note    Reason for Follow Up: Nausea    Subjective     Interval History:   Patient complains today still of debilitating nausea.  No relief with Zofran or Scopolamine.  She has seem to have had some relief with Phenergan suppository.    Current Facility-Administered Medications:   •  acetaminophen (TYLENOL) tablet 650 mg, 650 mg, Oral, Q4H PRN, Leno Segura MD, 650 mg at 10/04/21 2309  •  HYDROcodone-acetaminophen (NORCO) 5-325 MG per tablet 1 tablet, 1 tablet, Oral, Q4H PRN, Leno Segura MD  •  hydrOXYzine (ATARAX) tablet 25 mg, 25 mg, Oral, TID PRN, Ros Laurent APRN, 25 mg at 10/05/21 1249  •  lactated ringers infusion, 30 mL/hr, Intravenous, Continuous, Leno Segura MD, Stopped at 10/04/21 1712  •  morphine injection 1 mg, 1 mg, Intravenous, Q4H PRN **AND** naloxone (NARCAN) injection 0.4 mg, 0.4 mg, Intravenous, Q5 Min PRN, Leno Segura MD  •  ondansetron (ZOFRAN) tablet 4 mg, 4 mg, Oral, Q6H PRN **OR** ondansetron (ZOFRAN) injection 4 mg, 4 mg, Intravenous, Q6H PRN, Leno Segura MD, 4 mg at 10/05/21 0513  •  ondansetron ODT (ZOFRAN-ODT) disintegrating tablet 4 mg, 4 mg, Oral, Q12H PRN, Leno Segura MD  •  pantoprazole (PROTONIX) injection 40 mg, 40 mg, Intravenous, Q AM, Leno Segura MD, 40 mg at 10/05/21 0513  •  prochlorperazine (COMPAZINE) injection 10 mg, 10 mg, Intravenous, Q6H PRN, Ros Laurent APRN  •  promethazine (PHENERGAN) suppository 12.5 mg, 12.5 mg, Rectal, Q8H PRN, Leno Segura MD, 12.5 mg at 10/05/21 1249  •  scopolamine patch 1 mg/72 hr, 1 patch, Transdermal, Q72H, Leno Segura MD, 1 patch at 10/04/21 1432  •  [COMPLETED] Insert peripheral IV, , , Once **AND** sodium chloride 0.9 % flush 10 mL, 10 mL, Intravenous, PRN, Leno Segura MD  •  sodium chloride 0.9 % flush 10 mL, 10 mL, Intravenous, Q12H, Leno Segura MD, 10 mL at 10/04/21 5798  •  sodium chloride 0.9 % flush 10 mL,  10 mL, Intravenous, PRN, Leno Segura MD  •  sodium chloride 0.9 % infusion, 100 mL/hr, Intravenous, Continuous, Leno Segura MD, Last Rate: 100 mL/hr at 10/05/21 1411, 100 mL/hr at 10/05/21 1411  •  sodium chloride 0.9 % infusion, 30 mL/hr, Intravenous, Continuous PRN, Leno Segura MD, Last Rate: 30 mL/hr at 10/04/21 1522, 30 mL/hr at 10/04/21 1522  •  sucralfate (CARAFATE) tablet 1 g, 1 g, Oral, 4x Daily, Leno Segura MD, 1 g at 10/04/21 2133  Review of Systems:    All systems were reviewed and negative except for:  Gastrointestinal: positive for  nausea    Objective     Vital Signs  Temp:  [97 °F (36.1 °C)-99.5 °F (37.5 °C)] 99.5 °F (37.5 °C)  Heart Rate:  [58-82] 61  Resp:  [16-36] 18  BP: ()/(56-69) 105/58  Body mass index is 19.89 kg/m².    Intake/Output Summary (Last 24 hours) at 10/5/2021 1421  Last data filed at 10/5/2021 0517  Gross per 24 hour   Intake --   Output 900 ml   Net -900 ml     No intake/output data recorded.     Physical Exam:   General: patient awake, alert and cooperative   Eyes: no scleral icterus   Skin: warm and dry, not jaundiced   Abdomen: soft, nontender, nondistended; normal bowel sounds, no masses palpated, no periumbical lymphadenopathy   Psychiatric: Appropriate affect and behavior     Results Review:     I reviewed the patient's new clinical results.    Results from last 7 days   Lab Units 10/03/21  1805 10/01/21  1704   WBC 10*3/mm3 7.39 7.70   HEMOGLOBIN g/dL 13.6 14.7   HEMATOCRIT % 41.9 46.1   PLATELETS 10*3/mm3 435 470*     Results from last 7 days   Lab Units 10/03/21  1805 10/01/21  1704   SODIUM mmol/L 144 139   POTASSIUM mmol/L 3.7 3.9   CHLORIDE mmol/L 108* 102   CO2 mmol/L 23.9 23.4   BUN mg/dL 8 12   CREATININE mg/dL 0.77 0.82   CALCIUM mg/dL 9.7 10.2   BILIRUBIN mg/dL 0.2 0.3   ALK PHOS U/L 102 112   ALT (SGPT) U/L 15 21   AST (SGOT) U/L 13 17   GLUCOSE mg/dL 98 96         Lab Results   Lab Value Date/Time    LIPASE 68 (H) 10/03/2021 6628     LIPASE 53 10/01/2021 1704    LIPASE 45 09/25/2021 1002    LIPASE 34 09/22/2021 1120       Radiology:  NM Gastric Emptying   Final Result   Abnormal delay in gastric emptying with increased gastric   retention at 2 hours, 3 hours, 4 hours. Gastric retention at 4 hours   measures 59% [normal 10% or less].       This report was finalized on 10/5/2021 12:26 PM by Dr. Ezequiel Soliz M.D.          CT Angiogram Chest   Final Result       1. No acute intrathoracic findings.   2. Mild stranding noted within the operative bed, without organized   fluid collection seen. Similar findings were present on prior study.       Radiation dose reduction techniques were utilized, including automated   exposure control and exposure modulation based on body size.       This report was finalized on 10/3/2021 10:56 PM by Dr. Kira Rogers M.D.          NM Hepatobiliary Without CCK   Final Result   No evidence of bile leak.        This report was finalized on 10/3/2021 9:54 PM by Dr. Kira Rogers M.D.              Assessment/Plan     Patient Active Problem List   Diagnosis   • Calculus of gallbladder with cholecystitis without biliary obstruction   • Epigastric pain   • Nausea   • Anxiety disorder       Assessment:  1. Nausea.  Patient's symptoms seem to have responded at least somewhat to Phenergan suppository but nothing else has seemed to help.  EGD yesterday unremarkable with biopsies also negative.  2. Gastroparesis.  Gastric emptying study reveals significant delay at 4 hours consistent with gastroparesis.  3. Status post cholecystectomy  4. Elevated lipase.  Patient has minimally elevated lipase but CT showed no evidence of pancreatitis.      Plan:  · Recommend dietary consult for counseling regarding gastroparesis diet  · May try erythromycin 250 mg p.o. 3 times daily before meals  · Continue Phenergan suppositories as needed  · Would follow-up with Dr. Lenz as an outpatient potentially try other treatment  modalities for gastroparesis such as Motilium and would even consider [facility] to [facility] if necessary  I discussed the patients findings and my recommendations with patient and nursing staff.    MD Clifford Owens M.D.  [facility] [place] [address]  Office: [phone]

## 2021-10-05 NOTE — PLAN OF CARE
" Goal Outcome Evaluation:  Plan of Care Reviewed With: patient     VSS, afebrile, alert and oriented, c/o of nausea- zofran given as ordered and cold rags placed, voiding, c/o of abdominal pain- tylenol given as ordered, NM gastric emptying study scheduled today, ivf infusing as ordered, pt seems very anxious early this morning- pt states \" I can't feel my body, I feel like I'm going to faint\", Whitney was called, MD on call- Jermaine notified, [facility] consult placed, instructed pt to take deep breaths-pt eventually was able to calm herself down           "

## 2021-10-05 NOTE — CODE DOCUMENTATION
"Rapid called for increase in SOB, pt very anxious, stating \"help me, I cant feel my body!\", VSS, pt hyperventilating, primary RN placed call to GI now-waiting for response    Nuclear med to come get pt now for gastric emptying study, states shes very nauseas but unable to take any more meds at this time in order to not interfere with the scan    MD aware-said to give anxiety meds but d/t scan-unable to give at this time, will reassess, Primary nurse to call with any questions  "

## 2021-10-05 NOTE — PROGRESS NOTES
Postop cholecystectomy    Patient had an anxiety attack this morning.  She still seems very anxious.  She still reports abdominal pain and nausea.  She has been able to keep a little bit of food down.  Eating does not seem to make her nausea worse, but she really has no appetite.    Objective:  Afebrile with stable vitals currently  General: Anxious and tearful, stressed  Abdomen: Soft and benign, incisions well-healed    Gastric emptying study shows delay of emptying    Assessment and plan:  -Nausea and vomiting and probable gastric emptying disorder  -Not sure why this is suddenly coming up on her  -No evidence of postsurgical complication  -Appreciate medicine and GI input, not much more to be added from surgical side of things  -Symptomatic management    Leno Segura MD  General and Endoscopic Surgery  [facility]    [address], [address]  [place], [place], [address]  P: [phone]  F: [phone]

## 2021-10-06 ENCOUNTER — READMISSION MANAGEMENT (OUTPATIENT)
Dept: CALL CENTER | Facility: HOSPITAL | Age: 51
End: 2021-10-06

## 2021-10-06 VITALS
RESPIRATION RATE: 16 BRPM | TEMPERATURE: 97.9 F | BODY MASS INDEX: 19.79 KG/M2 | DIASTOLIC BLOOD PRESSURE: 54 MMHG | OXYGEN SATURATION: 100 % | WEIGHT: 115.9 LBS | HEIGHT: 64 IN | HEART RATE: 86 BPM | SYSTOLIC BLOOD PRESSURE: 108 MMHG

## 2021-10-06 LAB — UREASE TISS QL: NEGATIVE

## 2021-10-06 PROCEDURE — 99024 POSTOP FOLLOW-UP VISIT: CPT | Performed by: SURGERY

## 2021-10-06 PROCEDURE — 99213 OFFICE O/P EST LOW 20 MIN: CPT | Performed by: PHYSICIAN ASSISTANT

## 2021-10-06 PROCEDURE — 90791 PSYCH DIAGNOSTIC EVALUATION: CPT

## 2021-10-06 PROCEDURE — G0378 HOSPITAL OBSERVATION PER HR: HCPCS

## 2021-10-06 RX ORDER — PROMETHAZINE HYDROCHLORIDE 12.5 MG/1
12.5 SUPPOSITORY RECTAL EVERY 8 HOURS PRN
Qty: 10 SUPPOSITORY | Refills: 0 | Status: SHIPPED | OUTPATIENT
Start: 2021-10-06 | End: 2021-10-07 | Stop reason: SDUPTHER

## 2021-10-06 RX ORDER — HYDROCODONE BITARTRATE AND ACETAMINOPHEN 5; 325 MG/1; MG/1
1 TABLET ORAL EVERY 4 HOURS PRN
Qty: 10 TABLET | Refills: 0 | Status: SHIPPED | OUTPATIENT
Start: 2021-10-06 | End: 2021-10-10

## 2021-10-06 RX ORDER — LORAZEPAM 0.5 MG/1
0.5 TABLET ORAL EVERY 6 HOURS PRN
Qty: 24 TABLET | Refills: 0 | Status: SHIPPED | OUTPATIENT
Start: 2021-10-06 | End: 2021-10-07 | Stop reason: SDUPTHER

## 2021-10-06 RX ORDER — ERYTHROMYCIN 250 MG/1
250 TABLET, COATED ORAL
Qty: 87 TABLET | Refills: 0 | Status: SHIPPED | OUTPATIENT
Start: 2021-10-06 | End: 2021-10-11

## 2021-10-06 RX ADMIN — SUCRALFATE 1 G: 1 TABLET ORAL at 09:57

## 2021-10-06 RX ADMIN — SUCRALFATE 1 G: 1 TABLET ORAL at 13:02

## 2021-10-06 RX ADMIN — PROMETHAZINE HYDROCHLORIDE 12.5 MG: 12.5 SUPPOSITORY RECTAL at 05:41

## 2021-10-06 RX ADMIN — LORAZEPAM 0.5 MG: 0.5 TABLET ORAL at 15:03

## 2021-10-06 RX ADMIN — LORAZEPAM 0.5 MG: 0.5 TABLET ORAL at 05:41

## 2021-10-06 RX ADMIN — PROMETHAZINE HYDROCHLORIDE 12.5 MG: 12.5 SUPPOSITORY RECTAL at 15:26

## 2021-10-06 RX ADMIN — ERYTHROMYCIN 250 MG: 250 TABLET, FILM COATED ORAL at 16:59

## 2021-10-06 RX ADMIN — ERYTHROMYCIN 250 MG: 250 TABLET, FILM COATED ORAL at 12:02

## 2021-10-06 RX ADMIN — ERYTHROMYCIN 250 MG: 250 TABLET, FILM COATED ORAL at 07:15

## 2021-10-06 NOTE — NURSING NOTE
S/w JOANNA SAHNI, she does not want to prescribe anti anxiety medication. Access recommended a psychiatry consult to the nurse.

## 2021-10-06 NOTE — CONSULTS
"Adult Nutrition  Assessment/PES    Patient Name:  Mary Jensen  YOB: 1970  MRN: [id number]  Admit Date:  10/3/2021    Assessment Date:  10/6/2021    Comments:  Nutrition Consult: Diet Education  51 y.o female with severe nausea, abdominal pain, anxiety. Gastric emptying study reveals significant delay at 4 hours, consistent with gastroparesis. Provided diet education at the bedside on following a diet for Gastroparesis. Discussed 4-6 small meals per day, low fat items, low fiber options, and ways to increase her calories to prevent further weight loss. Handout provided and questions answered. Patient states she tolerated her lunch today and ate about 50%. She feels that Ativan helped today.    Current weight: 115 lb, Previous weight: 125 lb (1 month ago).   RD to continue to follow clinical course.     Reason for Assessment     Row Name 10/06/21 1342          Reason for Assessment    Reason For Assessment  identified at risk by screening criteria;physician consult     Diagnosis  other (see comments) Nausous, abd pain, anxiety     Identified At Risk by Screening Criteria  need for education         Nutrition/Diet History     Row Name 10/06/21 1343          Nutrition/Diet History    Typical Food/Fluid Intake  admitted with severe nausea, abdominal pain and anxiety.  Gastric emptying study reveals significant delay at 4 hours consistent with gastroparesis. Consult for diet educaiton.     Factors Affecting Nutritional Intake  altered gastrointestinal function         Anthropometrics     Row Name 10/06/21 1344          Anthropometrics    Height  162.6 cm (64.02\")     Weight  52.6 kg (115 lb 14.4 oz) not weighed by RD        Ideal Body Weight (IBW)    Ideal Body Weight (IBW) (kg)  55.04     % Ideal Body Weight  95.52        Usual Body Weight (UBW)    Weight Loss  unintentional 10 lb     Weight Loss Time Frame  1 month        Body Mass Index (BMI)    BMI (kg/m2)  19.93         Labs/Tests/Procedures/Meds     " "Row Name 10/06/21 1345          Labs/Procedures/Meds    Lab Results Reviewed  reviewed, pertinent     Lab Results Comments  Cl, Lipase        Diagnostic Tests/Procedures    Diagnostic Test/Procedure Reviewed  reviewed        Medications    Pertinent Medications Reviewed  reviewed, pertinent     Pertinent Medications Comments  Protonix, NaCl, 100ml/hr IVF, Ativan         Physical Findings     Row Name 10/06/21 1346          Physical Findings    Skin  other (see comments) abd incision         Estimated/Assessed Needs     Row Name 10/06/21 1344          Calculation Measurements    Height  162.6 cm (64.02\")         Nutrition Prescription Ordered     Row Name 10/06/21 1346          Nutrition Prescription PO    Current PO Diet  Regular     Fluid Consistency  Thin     Common Modifiers  GI Soft/Magoffin         Evaluation of Received Nutrient/Fluid Intake     Row Name 10/06/21 1347          PO Evaluation    Number of Meals  1     % PO Intake  50               Problem/Interventions:  Problem 1     Row Name 10/06/21 1347          Nutrition Diagnoses Problem 1    Problem 1  Knowledge Deficit     Etiology (related to)  Medical Diagnosis     Gastrointestinal  Gastroparesis     Signs/Symptoms (evidenced by)  Report of Mnimal PO Intake;Unintended Weight Change     Unintended Weight Change  Loss     Number of Pounds Lost  10     Weight loss time period  1               Intervention Goal     Row Name 10/06/21 1349          Intervention Goal    General  Maintain nutrition;Reduce/improve symptoms;Meet nutritional needs for age/condition     PO  Tolerate PO;PO intake (%)     PO Intake %  75 %     Weight  Maintain weight         Nutrition Intervention     Row Name 10/06/21 1349          Nutrition Intervention    RD/Tech Action  Follow Tx progress;Care plan reviewd           Education/Evaluation     Row Name 10/06/21 1346          Education    Education  Provided education regarding     Provided education regarding  Diet rationale        " Monitor/Evaluation    Monitor  Per protocol     Education Follow-up  Reinforce PRN           Electronically signed by:  Ruma Aguirre RD  10/06/21 13:49 EDT

## 2021-10-06 NOTE — DISCHARGE SUMMARY
Discharge Summary    Patient name: Mary Jensen    Medical record number: [id number]    Admission date: 10/3/2021  Discharge date: 10/6/2021    Attending physician: Leno Segura MD    Primary care physician: Provider, No Known    Referring physician: Victor M Grimes MD  [address] [place] [id number]    Consulting physician(s): Gastroenterology, psychiatry, internal medicine    Condition on discharge: improving    Primary Diagnoses: Nausea, vomiting, abdominal pain    Secondary Diagnoses: Severe anxiety    Operative Procedure: EGD done by the GI service    Hospital Course: The patient is a  51 y.o. female that was admitted to the hospital with severe nausea, abdominal pain and anxiety.  She was seen in the ER, it was felt that anxiety was a component.  She was initially brought to the observation unit, worked up with a CT which was negative, labs which were largely negative, and a HIDA scan which showed no evidence of leak.  She was brought into the hospital, she underwent EGD by the GI service which was essentially negative.  Gastric emptying scan did demonstrate evidence of delayed gastric emptying.  She was started on erythromycin by the GI service.  Anxiety continue to dominate her hospital stay, and she was started on Ativan by the hospitalist service, which is altered and dramatic improvement of nearly all of her GI symptoms.  Last night and today she has been able to eat without nausea and is essentially feeling back at her baseline.  She was seen by psychiatry who plans to follow her up as an outpatient, as does GI.    Discharge medications:      Discharge Medications      New Medications      Instructions Start Date   erythromycin base 250 MG tablet  Commonly known as: E-MYCIN   250 mg, Oral, 3 Times Daily Before Meals      HYDROcodone-acetaminophen 5-325 MG per tablet  Commonly known as: NORCO   1 tablet, Oral, Every 4 Hours PRN      LORazepam 0.5 MG tablet  Commonly known as: ATIVAN   0.5 mg,  Oral, Every 6 Hours PRN      promethazine 12.5 MG suppository  Commonly known as: PHENERGAN   12.5 mg, Rectal, Every 8 Hours PRN         Continue These Medications      Instructions Start Date   omeprazole 40 MG capsule  Commonly known as: priLOSEC   40 mg, Oral, Daily      ondansetron ODT 4 MG disintegrating tablet  Commonly known as: ZOFRAN-ODT   4 mg, Translingual, Every 8 Hours PRN         Stop These Medications    metoclopramide 10 MG tablet  Commonly known as: REGLAN     sucralfate 1 g tablet  Commonly known as: CARAFATE            Discharge instructions: No lifting over 10 pounds until she reaches 2 weeks postop.      Follow-up appointment: Follow up with Dr. Lenz of the GI service and with psychiatry as scheduled.  Follow-up with me on an as-needed basis.  She will need to arrange follow-up with primary care, I will not be able to fill her psychiatric medications going forward.      Leno Segura MD  General and Endoscopic Surgery  [facility]    [address], [address]  [place], [place], [address]  P: [phone]  F: [phone]

## 2021-10-06 NOTE — PROGRESS NOTES
[facility]  Inpatient Progress Note    Reason for Follow Up: Nausea    Subjective     Interval History:   Overall doing much better today.  Still with nausea but improved with the same suppositories.  She has never had any vomiting.  She reports no relief with oral Zofran or oral promethazine.  She asked for scopolamine patches but unfortunately she does not have prescription drug coverage to be too expensive for her.    He does admit to significant stress since August which is when her symptoms also started.    Current Facility-Administered Medications:   •  acetaminophen (TYLENOL) tablet 650 mg, 650 mg, Oral, Q4H PRN, Leno Segura MD, 650 mg at 10/04/21 2309  •  erythromycin base (E-MYCIN) tablet 250 mg, 250 mg, Oral, TID AC, Clifford Reeder MD, 250 mg at 10/06/21 1202  •  HYDROcodone-acetaminophen (NORCO) 5-325 MG per tablet 1 tablet, 1 tablet, Oral, Q4H PRN, Leno Segura MD  •  hydrOXYzine (ATARAX) tablet 25 mg, 25 mg, Oral, TID PRN, Marck Heath MD  •  LORazepam (ATIVAN) tablet 0.5 mg, 0.5 mg, Oral, Q6H PRN, Marck Heath MD, 0.5 mg at 10/06/21 1503  •  morphine injection 1 mg, 1 mg, Intravenous, Q4H PRN **AND** naloxone (NARCAN) injection 0.4 mg, 0.4 mg, Intravenous, Q5 Min PRN, Leno Segura MD  •  ondansetron (ZOFRAN) tablet 4 mg, 4 mg, Oral, Q6H PRN **OR** ondansetron (ZOFRAN) injection 4 mg, 4 mg, Intravenous, Q6H PRN, Leno Segura MD, 4 mg at 10/05/21 0513  •  ondansetron ODT (ZOFRAN-ODT) disintegrating tablet 4 mg, 4 mg, Oral, Q12H PRN, Leno Segura MD  •  pantoprazole (PROTONIX) injection 40 mg, 40 mg, Intravenous, Q AM, Leno Segura MD, 40 mg at 10/05/21 0513  •  prochlorperazine (COMPAZINE) injection 10 mg, 10 mg, Intravenous, Q6H PRN, Ros Laurent APRN, 10 mg at 10/05/21 1445  •  promethazine (PHENERGAN) suppository 12.5 mg, 12.5 mg, Rectal, Q8H PRN, Leno Segura MD, 12.5 mg at 10/06/21 0541  •   scopolamine patch 1 mg/72 hr, 1 patch, Transdermal, Q72H, Leno Segura MD, 1 patch at 10/04/21 1432  •  [COMPLETED] Insert peripheral IV, , , Once **AND** sodium chloride 0.9 % flush 10 mL, 10 mL, Intravenous, PRN, Leno Segura MD  •  sodium chloride 0.9 % flush 10 mL, 10 mL, Intravenous, Q12H, Leno Segura MD, 10 mL at 10/04/21 2133  •  sodium chloride 0.9 % flush 10 mL, 10 mL, Intravenous, PRN, Leno Segura MD  •  sodium chloride 0.9 % infusion, 100 mL/hr, Intravenous, Continuous, Leno Segura MD, Last Rate: 100 mL/hr at 10/05/21 1819, 100 mL/hr at 10/05/21 1819  •  sodium chloride 0.9 % infusion, 30 mL/hr, Intravenous, Continuous PRN, Leno Segura MD, Last Rate: 30 mL/hr at 10/04/21 1522, 30 mL/hr at 10/04/21 1522  •  sucralfate (CARAFATE) tablet 1 g, 1 g, Oral, 4x Daily, Leno Segura MD, 1 g at 10/06/21 1302  Review of Systems:    The following systems were reviewed and negative;  constitution, respiratory and cardiovascular    Objective     Vital Signs  Temp:  [97.9 °F (36.6 °C)-98.8 °F (37.1 °C)] 97.9 °F (36.6 °C)  Heart Rate:  [60-86] 86  Resp:  [16-18] 16  BP: ()/(54-73) 108/54  Body mass index is 19.88 kg/m².    Intake/Output Summary (Last 24 hours) at 10/6/2021 1503  Last data filed at 10/6/2021 0900  Gross per 24 hour   Intake 1098.34 ml   Output 1600 ml   Net -501.66 ml     I/O this shift:  In: 210 [P.O.:210]  Out: 500 [Urine:500]     Physical Exam:   General: patient awake, alert and cooperative, walking around   Eyes: Normal lids and lashes, no scleral icterus   Skin: warm and dry, not jaundiced   Pulm:  regular and unlabored   Psychiatric: Normal mood and behavior; memory intact     Results Review:     I reviewed the patient's new clinical results.    Results from last 7 days   Lab Units 10/03/21  1805 10/01/21  1704   WBC 10*3/mm3 7.39 7.70   HEMOGLOBIN g/dL 13.6 14.7   HEMATOCRIT % 41.9 46.1   PLATELETS 10*3/mm3 435 470*     Results from last 7 days    Lab Units 10/03/21  1805 10/01/21  1704   SODIUM mmol/L 144 139   POTASSIUM mmol/L 3.7 3.9   CHLORIDE mmol/L 108* 102   CO2 mmol/L 23.9 23.4   BUN mg/dL 8 12   CREATININE mg/dL 0.77 0.82   CALCIUM mg/dL 9.7 10.2   BILIRUBIN mg/dL 0.2 0.3   ALK PHOS U/L 102 112   ALT (SGPT) U/L 15 21   AST (SGOT) U/L 13 17   GLUCOSE mg/dL 98 96         Lab Results   Lab Value Date/Time    LIPASE 68 (H) 10/03/2021 1805    LIPASE 53 10/01/2021 1704    LIPASE 45 09/25/2021 1002    LIPASE 34 09/22/2021 1120       Radiology:  NM Gastric Emptying   Final Result   Abnormal delay in gastric emptying with increased gastric   retention at 2 hours, 3 hours, 4 hours. Gastric retention at 4 hours   measures 59% [normal 10% or less].       This report was finalized on 10/5/2021 12:26 PM by Dr. Ezequiel Soliz M.D.          CT Angiogram Chest   Final Result       1. No acute intrathoracic findings.   2. Mild stranding noted within the operative bed, without organized   fluid collection seen. Similar findings were present on prior study.       Radiation dose reduction techniques were utilized, including automated   exposure control and exposure modulation based on body size.       This report was finalized on 10/3/2021 10:56 PM by Dr. Kira Rogers M.D.          NM Hepatobiliary Without CCK   Final Result   No evidence of bile leak.        This report was finalized on 10/3/2021 9:54 PM by Dr. Kira Rogers M.D.              Assessment/Plan     Patient Active Problem List   Diagnosis   • Calculus of gallbladder with cholecystitis without biliary obstruction   • Epigastric pain   • Nausea   • Anxiety disorder       Assessment:  1. Nausea, likely due to #2, responsive to promethazine suppositories.  Unresponsive to Zofran and promethazine oral.  2. Gastroparesis, GES with delay at 4 hours  3. S/p cholecystectomy, 2 weeks ago  4. Elevated lipase, minimally, CT without evidence of pancreatitis      Plan:  · She met with dietary to go  over gastroparesis diet  · Continue promethazine suppositories as needed  · Can consider other treatment modalities including macrolide antibiotics versus domperidone.  If she persists with symptoms may need motility clinic referral.  · Follow-up in the office in 1 to 2 weeks.    I discussed the patients findings and my recommendations with patient.    Dragon dictation used throughout this note.            Virginia Hurd PA-C  [facility]  [address] [place] [address]  Office: [phone]

## 2021-10-06 NOTE — PROGRESS NOTES
Name: Mary Jensen ADMIT: 10/3/2021   : 1970  PCP: Provider, No Known    MRN: [id number] LOS: 0 days   AGE/SEX: 51 y.o. female  ROOM: [facility]     Subjective   Subjective   Patient appears relatively comfortable and in no apparent distress.  Tolerating p.o. and physical activity.  States hydroxyzine less than effective.  Eager to discharge home on 10/6/2021.    Review of Systems   Constitutional: Negative for chills and fever.   HENT: Negative for congestion and rhinorrhea.    Respiratory: Negative for cough and shortness of breath.    Cardiovascular: Negative for chest pain and leg swelling.   Gastrointestinal: Negative for abdominal pain, constipation, diarrhea and nausea.   Endocrine: Negative for polydipsia, polyphagia and polyuria.   Genitourinary: Negative for difficulty urinating and dysuria.   Musculoskeletal: Negative for back pain and gait problem.   Skin: Negative for rash and wound.   Neurological: Negative for dizziness, weakness and headaches.   Psychiatric/Behavioral: Negative for confusion and hallucinations.        Objective   Objective   Vital Signs  Temp:  [97.9 °F (36.6 °C)-98.8 °F (37.1 °C)] 97.9 °F (36.6 °C)  Heart Rate:  [60-86] 86  Resp:  [16-22] 16  BP: ()/(54-73) 108/54  SpO2:  [97 %-100 %] 100 %  on   ;   Device (Oxygen Therapy): room air  Body mass index is 19.89 kg/m².     Physical Exam  Constitutional:       General: She is not in acute distress.     Appearance: She is not toxic-appearing.   HENT:      Head: Normocephalic and atraumatic.   Eyes:      Extraocular Movements: Extraocular movements intact.      Conjunctiva/sclera: Conjunctivae normal.   Cardiovascular:      Rate and Rhythm: Normal rate.      Heart sounds: Normal heart sounds.   Pulmonary:      Effort: Pulmonary effort is normal.      Breath sounds: Normal breath sounds.   Abdominal:      General: Bowel sounds are normal.      Palpations: Abdomen is soft.   Musculoskeletal:         General: No tenderness.       Cervical back: Normal range of motion and neck supple.      Right lower leg: No edema.      Left lower leg: No edema.   Skin:     General: Skin is warm and dry.   Neurological:      Mental Status: She is alert and oriented to person, place, and time.      Cranial Nerves: No cranial nerve deficit.   Psychiatric:         Behavior: Behavior normal.         Thought Content: Thought content normal.         Results Review     I reviewed the patient's new clinical results.  Results from last 7 days   Lab Units 10/03/21  1805 10/01/21  1704   WBC 10*3/mm3 7.39 7.70   HEMOGLOBIN g/dL 13.6 14.7   PLATELETS 10*3/mm3 435 470*     Results from last 7 days   Lab Units 10/03/21  1805 10/01/21  1704   SODIUM mmol/L 144 139   POTASSIUM mmol/L 3.7 3.9   CHLORIDE mmol/L 108* 102   CO2 mmol/L 23.9 23.4   BUN mg/dL 8 12   CREATININE mg/dL 0.77 0.82   GLUCOSE mg/dL 98 96   Estimated Creatinine Clearance: 70.2 mL/min (by C-G formula based on SCr of 0.77 mg/dL).  Results from last 7 days   Lab Units 10/03/21  1805 10/01/21  1704   ALBUMIN g/dL 4.70 4.80   BILIRUBIN mg/dL 0.2 0.3   ALK PHOS U/L 102 112   AST (SGOT) U/L 13 17   ALT (SGPT) U/L 15 21     Results from last 7 days   Lab Units 10/03/21  1805 10/01/21  1704   CALCIUM mg/dL 9.7 10.2   ALBUMIN g/dL 4.70 4.80       COVID19   Date Value Ref Range Status   10/03/2021 Not Detected Not Detected - Ref. Range Final   09/25/2021 Not Detected Not Detected - Ref. Range Final     Glucose   Date/Time Value Ref Range Status   10/05/2021 0623 90 70 - 130 mg/dL Final     Comment:     Meter: [id number] : [id number] José Luis CONKLIN Gastric Emptying  Narrative: NUCLEAR MEDICINE GASTRIC EMPTYING STUDY     HISTORY:  Abdominal pain and nausea.     TECHNIQUE:  4 Hour solid gastric emptying study.    535 uCi of 99m  technetium sulfur colloid mixed in a standard solid meal with cooked  eggs, anterior static imaging over the abdomen performed hourly for 4  hours.      FINDINGS: The gastric  "retention measures:  76% at 1 hour (normal between 30-90%)  72% at 2 hours (normal 60% or less)  63% at 3 hours (normal 30% or less)  59% at 4 hours (normal 10% or less)     Impression: Abnormal delay in gastric emptying with increased gastric  retention at 2 hours, 3 hours, 4 hours. Gastric retention at 4 hours  measures 59% [normal 10% or less].     This report was finalized on 10/5/2021 12:26 PM by Dr. Ezequiel Soliz M.D.       Scheduled Medications  erythromycin base, 250 mg, Oral, TID AC  pantoprazole, 40 mg, Intravenous, Q AM  Scopolamine, 1 patch, Transdermal, Q72H  sodium chloride, 10 mL, Intravenous, Q12H  sucralfate, 1 g, Oral, 4x Daily    Infusions  sodium chloride, 100 mL/hr, Last Rate: 100 mL/hr (10/05/21 1819)  sodium chloride, 30 mL/hr, Last Rate: 30 mL/hr (10/04/21 1522)    Diet  Diet Regular; GI Soft       Assessment/Plan     Active Hospital Problems    Diagnosis  POA   • **Nausea [R11.0]  Yes   • Anxiety disorder [F41.9]  Unknown   • Epigastric pain [R10.13]  Yes      Resolved Hospital Problems   No resolved problems to display.       51 y.o. female admitted with Nausea & s/p EGD with biopsies and brushing.    Physical symptoms increasing patient's anxiety and panic attacks.  Increase hydroxyzine 25 mg p.o. twice daily to 3 times daily as needed for anxiety and consult psychiatry to advise on anxiolytics (\"Ativan\") & nurse reports Dr. Robles recommend follow-up in outpatient office with him soon as he is unable to see patient in hospital today on 10/6/2021.    Gastroenterology recommend dietary consult for gastroparesis diet (abnormal delayed gastric emptying on NM study), erythromycin 250 mg p.o. 3 times daily before meals, Phenergan suppositories as needed, follow Dr. Lenz in outpatient setting.  Continue PPI, carafate.     [facility] will sign off as patient appears medically stable & defer to Psychiatry for management of psychological condition.    · SCD for DVT " prophylaxis.  · CPR  · Discussed with Dr. Heath.   · Anticipate discharge pending primary (General surgery) to decision / patient appears medically stable for discharge home / CCP following      YOSELIN Rodriguez  [facility]  10/06/21  12:31 EDT

## 2021-10-06 NOTE — CONSULTS
"Access consulted for anxiety. Pt walking around in room upon entry. Pt sat in bed for interview. Full PPE, including mask and eyewear (gown and gloves when necessary), worn throughout encounter. Appropriate hand hygiene performed before and after patient contact and donning/doffing PPE. All information per patient report, unless otherwise noted.     Pt came to the hospital with abdominal pain. She had a cholecystectomy on 9/25 and had a few panic attacks since then, also nausea, and abdominal pain.     The pt directly correlates her anxiety increase to her dog dying on 7/26/21. It was unexpected and the dog was small enough that she brought it with her everywhere. She took it to work, traveling, etc. Her stress has also increased since her child got her driving license and is going to college next year.     The pt is a very nice and active person. 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     She reports that she never feels so down that she doesn't want to wake up in the morning. She has never had plans to hurt herself. She reports her depression a 5 out of 10. Her anxiety a 5 out of 10 but it has been \"through the charts lately.\"    The pt went to a therapist but did not feel the therapist was the right fit for her. She had a panic attack in the appt while talking about the loss of her dog.     Pt reports no recreational drug use.     Pt was receptive to receiving names of therapists. Because the pt is Protestant and attends [employer], Access gave her lists of therapists that are Denominational and have cognitive behavioral therapy experience.     Access discussed anxiety medication with her and the pt did not want a psychiatrist consult at this time because she is getting anti anxiety medication from the hospitalist and believes she will go home on ativan.     Access will continue to follow and offer support.   "

## 2021-10-06 NOTE — PLAN OF CARE
Goal Outcome Evaluation:  Plan of Care Reviewed With: patient        Progress: improving  Outcome Summary: Pt denies pain, anxiety and nausea, she states that she feels much better and that the first dose of Ativan PO and Phenergan suppository has eliminated all symptoms of anxiety, pt requested both meds at bedtime and this  morning to keep her anxiety and nausea under control, rested comfortably throughout, vss, afebrile, advancing to GI soft diet for breakfast, will continue to monitor.

## 2021-10-06 NOTE — PROGRESS NOTES
Postop cholecystectomy    Patient feels much better today after receiving some Ativan.  Denies any nausea or abdominal pain today.  She has been seen by psychiatry nursing, they recommend follow-up as outpatient.  Patient is wanting to go home today.  Abdominal exam completely benign.    Leno Segura MD  General and Endoscopic Surgery  [facility]    [address], [address]  [place], [place], [address]  P: [phone]  F: [phone]

## 2021-10-06 NOTE — OUTREACH NOTE
Prep Survey      Responses   [facility] patient discharged from?  [place]   Is LACE score < 7 ?  Yes   Emergency Room discharge w/ pulse ox?  No   Eligibility  [facility]   Date of Admission  10/03/21   Date of Discharge  10/06/21   Discharge diagnosis  Nausea, Vomiting, Abd pain, Anxiety   Does the patient have one of the following disease processes/diagnoses(primary or secondary)?  Other   Does the patient have Home health ordered?  No   Is there a DME ordered?  No   Prep survey completed?  Yes          Sil Schuster RN

## 2021-10-07 ENCOUNTER — TRANSITIONAL CARE MANAGEMENT TELEPHONE ENCOUNTER (OUTPATIENT)
Dept: CALL CENTER | Facility: HOSPITAL | Age: 51
End: 2021-10-07

## 2021-10-07 ENCOUNTER — OFFICE VISIT (OUTPATIENT)
Dept: FAMILY MEDICINE CLINIC | Facility: CLINIC | Age: 51
End: 2021-10-07

## 2021-10-07 ENCOUNTER — TELEPHONE (OUTPATIENT)
Dept: GASTROENTEROLOGY | Facility: CLINIC | Age: 51
End: 2021-10-07

## 2021-10-07 ENCOUNTER — TELEPHONE (OUTPATIENT)
Dept: SURGERY | Facility: CLINIC | Age: 51
End: 2021-10-07

## 2021-10-07 VITALS
HEIGHT: 64 IN | DIASTOLIC BLOOD PRESSURE: 70 MMHG | RESPIRATION RATE: 18 BRPM | OXYGEN SATURATION: 96 % | BODY MASS INDEX: 20.08 KG/M2 | TEMPERATURE: 97.7 F | SYSTOLIC BLOOD PRESSURE: 102 MMHG | WEIGHT: 117.6 LBS | HEART RATE: 90 BPM

## 2021-10-07 DIAGNOSIS — Z00.00 ENCOUNTER FOR MEDICAL EXAMINATION TO ESTABLISH CARE: Primary | ICD-10-CM

## 2021-10-07 DIAGNOSIS — F41.0 PANIC ATTACKS: ICD-10-CM

## 2021-10-07 DIAGNOSIS — F41.9 ANXIETY: ICD-10-CM

## 2021-10-07 DIAGNOSIS — K31.84 GASTROPARESIS: ICD-10-CM

## 2021-10-07 DIAGNOSIS — Z90.49 HX OF CHOLECYSTECTOMY: ICD-10-CM

## 2021-10-07 DIAGNOSIS — J32.0 CHRONIC MAXILLARY SINUSITIS: ICD-10-CM

## 2021-10-07 PROCEDURE — 99204 OFFICE O/P NEW MOD 45 MIN: CPT | Performed by: STUDENT IN AN ORGANIZED HEALTH CARE EDUCATION/TRAINING PROGRAM

## 2021-10-07 RX ORDER — PROMETHAZINE HYDROCHLORIDE 12.5 MG/1
12.5 SUPPOSITORY RECTAL EVERY 8 HOURS PRN
Qty: 10 SUPPOSITORY | Refills: 0 | Status: SHIPPED | OUTPATIENT
Start: 2021-10-07 | End: 2021-10-07 | Stop reason: SDUPTHER

## 2021-10-07 RX ORDER — PROMETHAZINE HYDROCHLORIDE 12.5 MG/1
12.5 SUPPOSITORY RECTAL EVERY 8 HOURS PRN
Qty: 10 SUPPOSITORY | Refills: 0 | Status: SHIPPED | OUTPATIENT
Start: 2021-10-07 | End: 2021-11-29

## 2021-10-07 RX ORDER — LORAZEPAM 0.5 MG/1
0.5 TABLET ORAL EVERY 6 HOURS PRN
Qty: 24 TABLET | Refills: 0 | Status: SHIPPED | OUTPATIENT
Start: 2021-10-07 | End: 2021-10-13

## 2021-10-07 RX ORDER — AZELASTINE 1 MG/ML
2 SPRAY, METERED NASAL 2 TIMES DAILY
Qty: 30 ML | Refills: 12 | Status: SHIPPED | OUTPATIENT
Start: 2021-10-07 | End: 2021-10-07 | Stop reason: SDUPTHER

## 2021-10-07 RX ORDER — FLUOXETINE 10 MG/1
10 CAPSULE ORAL DAILY
Qty: 30 CAPSULE | Refills: 3 | Status: SHIPPED | OUTPATIENT
Start: 2021-10-07 | End: 2021-10-14 | Stop reason: SDUPTHER

## 2021-10-07 RX ORDER — AZELASTINE 1 MG/ML
2 SPRAY, METERED NASAL 2 TIMES DAILY
Qty: 30 ML | Refills: 12 | Status: SHIPPED | OUTPATIENT
Start: 2021-10-07 | End: 2022-07-05 | Stop reason: SDUPTHER

## 2021-10-07 RX ORDER — PROMETHAZINE HYDROCHLORIDE 25 MG/1
SUPPOSITORY RECTAL EVERY 8 HOURS PRN
Status: CANCELLED | OUTPATIENT
Start: 2021-10-07

## 2021-10-07 NOTE — PLAN OF CARE
Goal Outcome Evaluation:              Outcome Summary: VSS. Up ad joseph. Phenergan and Ativan both given x 1. Denied pain. Psych consulted in regards to Ativan. Dr. Robles not able to see pt today, however pt scheduled an appt tomorrow with PCP. Stable for discharge today.

## 2021-10-07 NOTE — PROGRESS NOTES
Chief Complaint  Pt presented to clinic with   Chief Complaint   Patient presents with   • Establish Care     [facility] dx gastro proresis   • Anxiety     panic attacks started in the hospital          History of Present Illness  Ms. Hernandez 51-year-old female who presented to the clinic for establishing medical care.  Ms. Hernandez reported she is recently been discharged from the hospital.  She was admitted to the hospital for 2 days and she was diagnosed with gastroparesis during hospitalization.  Patient reported last month around 23 she undergone cholecystectomy but her symptoms did not resolve even after cholecystectomy she continued to have nausea vomiting.  Patient reported recently 2 months ago she lost her dog and now her children are moving so all these things making her feel anxious.  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  Patient reported just half an hour before she went for study she had worse panic attack and she thinks that episode might have some contribution to the abnormal study.  Review of Systems   Constitutional: Positive for fatigue. Negative for activity change, appetite change, fever and unexpected weight change.   HENT: Negative for congestion, rhinorrhea, sore throat and voice change.    Respiratory: Negative for cough, chest tightness, shortness of breath and wheezing.    Cardiovascular: Negative for chest pain and palpitations.   Gastrointestinal: Positive for nausea. Negative for abdominal distention, abdominal pain, diarrhea and vomiting.   Genitourinary: Negative for difficulty urinating and dysuria.   Musculoskeletal: Negative for arthralgias and myalgias.   Skin: Negative for rash.   Neurological: Negative for dizziness, tremors and weakness.   Hematological: Negative for adenopathy.   Psychiatric/Behavioral: Negative for sleep disturbance. The patient is  "nervous/anxious.        PMH:   Outpatient Medications Prior to Visit   Medication Sig Dispense Refill   • erythromycin base (E-MYCIN) 250 MG tablet Take 1 tablet by mouth 3 (Three) Times a Day Before Meals for 87 doses. Indications: Delayed or Stopped Emptying of Stomach, Gastroparesis 87 tablet 0   • HYDROcodone-acetaminophen (NORCO) 5-325 MG per tablet Take 1 tablet by mouth Every 4 (Four) Hours As Needed for Moderate Pain  for up to 4 days. 10 tablet 0   • LORazepam (ATIVAN) 0.5 MG tablet Take 1 tablet by mouth Every 6 (Six) Hours As Needed for Anxiety for up to 6 days. 24 tablet 0   • promethazine (PHENERGAN) 12.5 MG suppository Insert 1 suppository into the rectum Every 8 (Eight) Hours As Needed for Nausea or Vomiting. 10 suppository 0   • omeprazole (priLOSEC) 40 MG capsule Take 40 mg by mouth Daily.     • ondansetron ODT (ZOFRAN-ODT) 4 MG disintegrating tablet Place 4 mg on the tongue Every 8 (Eight) Hours As Needed for Nausea or Vomiting.       No facility-administered medications prior to visit.      No Known Allergies  Past Surgical History:   Procedure Laterality Date   • CHOLECYSTECTOMY WITH INTRAOPERATIVE CHOLANGIOGRAM N/A 9/25/2021    Procedure: Laparoscopic cholecystectomy;  Surgeon: Leno Segura MD;  Location: [facility] MAIN OR;  Service: General;  Laterality: N/A;   • ENDOSCOPY N/A 10/4/2021    Procedure: ESOPHAGOGASTRODUODENOSCOPY with biopsies, and brushing;  Surgeon: J Carlos Francis MD;  Location: [facility] ENDOSCOPY;  Service: Gastroenterology;  Laterality: N/A;  pre: PERSISTENT NAUSEA  AND VOMITING  post:  normal   • SINUS SURGERY       Family history is unknown by patient.   reports that she has never smoked. She has never used smokeless tobacco. She reports that she does not drink alcohol and does not use drugs.     /70 (BP Location: Left arm, Patient Position: Sitting)   Pulse 90   Temp 97.7 °F (36.5 °C) (Oral)   Resp 18   Ht 162.6 cm (64\")   Wt 53.3 kg (117 lb 9.6 oz)   LMP  " (LMP Unknown)   SpO2 96%   BMI 20.19 kg/m²   Physical Exam  HENT:      Head: Normocephalic and atraumatic.      Mouth/Throat:      Mouth: Mucous membranes are moist.      Pharynx: Oropharynx is clear.   Eyes:      Extraocular Movements: Extraocular movements intact.      Conjunctiva/sclera: Conjunctivae normal.      Pupils: Pupils are equal, round, and reactive to light.   Cardiovascular:      Rate and Rhythm: Normal rate and regular rhythm.   Pulmonary:      Effort: Pulmonary effort is normal.      Breath sounds: Normal breath sounds.   Abdominal:      General: Bowel sounds are normal.      Palpations: Abdomen is soft.   Musculoskeletal:         General: Normal range of motion.      Cervical back: Neck supple.   Skin:     General: Skin is warm.      Capillary Refill: Capillary refill takes less than 2 seconds.   Neurological:      General: No focal deficit present.      Mental Status: She is alert and oriented to person, place, and time. Mental status is at baseline.   Psychiatric:         Mood and Affect: Mood normal.        WBC   Date Value Ref Range Status   10/03/2021 7.39 3.40 - 10.80 10*3/mm3 Final     RBC   Date Value Ref Range Status   10/03/2021 4.38 3.77 - 5.28 10*6/mm3 Final     Hemoglobin   Date Value Ref Range Status   10/03/2021 13.6 12.0 - 15.9 g/dL Final     Hematocrit   Date Value Ref Range Status   10/03/2021 41.9 34.0 - 46.6 % Final     MCV   Date Value Ref Range Status   10/03/2021 95.7 79.0 - 97.0 fL Final     MCH   Date Value Ref Range Status   10/03/2021 31.1 26.6 - 33.0 pg Final     MCHC   Date Value Ref Range Status   10/03/2021 32.5 31.5 - 35.7 g/dL Final     RDW   Date Value Ref Range Status   10/03/2021 12.3 12.3 - 15.4 % Final     RDW-SD   Date Value Ref Range Status   10/03/2021 43.6 37.0 - 54.0 fl Final     MPV   Date Value Ref Range Status   10/03/2021 8.7 6.0 - 12.0 fL Final     Platelets   Date Value Ref Range Status   10/03/2021 435 140 - 450 10*3/mm3 Final     Neutrophil %    Date Value Ref Range Status   10/03/2021 67.1 42.7 - 76.0 % Final     Lymphocyte %   Date Value Ref Range Status   10/03/2021 24.1 19.6 - 45.3 % Final     Monocyte %   Date Value Ref Range Status   10/03/2021 7.2 5.0 - 12.0 % Final     Eosinophil %   Date Value Ref Range Status   10/03/2021 0.8 0.3 - 6.2 % Final     Basophil %   Date Value Ref Range Status   10/03/2021 0.5 0.0 - 1.5 % Final     Immature Grans %   Date Value Ref Range Status   10/03/2021 0.3 0.0 - 0.5 % Final     Neutrophils, Absolute   Date Value Ref Range Status   10/03/2021 4.96 1.70 - 7.00 10*3/mm3 Final     Lymphocytes, Absolute   Date Value Ref Range Status   10/03/2021 1.78 0.70 - 3.10 10*3/mm3 Final     Monocytes, Absolute   Date Value Ref Range Status   10/03/2021 0.53 0.10 - 0.90 10*3/mm3 Final     Eosinophils, Absolute   Date Value Ref Range Status   10/03/2021 0.06 0.00 - 0.40 10*3/mm3 Final     Basophils, Absolute   Date Value Ref Range Status   10/03/2021 0.04 0.00 - 0.20 10*3/mm3 Final     Immature Grans, Absolute   Date Value Ref Range Status   10/03/2021 0.02 0.00 - 0.05 10*3/mm3 Final     nRBC   Date Value Ref Range Status   10/03/2021 0.0 0.0 - 0.2 /100 WBC Final     Lab Results   Component Value Date    GLUCOSE 98 10/03/2021    BUN 8 10/03/2021    CREATININE 0.77 10/03/2021    EGFRIFNONA 78 10/03/2021    BCR 10.4 10/03/2021    K 3.7 10/03/2021    CO2 23.9 10/03/2021    CALCIUM 9.7 10/03/2021    ALBUMIN 4.70 10/03/2021    AST 13 10/03/2021    ALT 15 10/03/2021   gastric emptying study  FINDINGS: The gastric retention measures:  76% at 1 hour (normal between 30-90%)  72% at 2 hours (normal 60% or less)  63% at 3 hours (normal 30% or less)  59% at 4 hours (normal 10% or less)     IMPRESSION:  Abnormal delay in gastric emptying with increased gastric  retention at 2 hours, 3 hours, 4 hours. Gastric retention at 4 hours  measures 59% [normal 10% or less].    Diagnoses and all orders for this visit:    1. Encounter for medical  examination to establish care (Primary)    2. Gastroparesis  Comments:  Prescribed erythromycin by GI specialist but patient said it cost her $400 even with insurance so she has not taken medication outside the hospital  Orders:  -     Discontinue: promethazine (PHENERGAN) 12.5 MG suppository; Insert 1 suppository into the rectum Every 8 (Eight) Hours As Needed for Nausea or Vomiting.  Dispense: 10 suppository; Refill: 0  -     promethazine (PHENERGAN) 12.5 MG suppository; Insert 1 suppository into the rectum Every 8 (Eight) Hours As Needed for Nausea or Vomiting.  Dispense: 10 suppository; Refill: 0    3. Chronic maxillary sinusitis  -     Discontinue: azelastine (ASTELIN) 0.1 % nasal spray; 2 sprays into the nostril(s) as directed by provider 2 (Two) Times a Day. Use in each nostril as directed  Dispense: 30 mL; Refill: 12  -     azelastine (ASTELIN) 0.1 % nasal spray; 2 sprays into the nostril(s) as directed by provider 2 (Two) Times a Day. Use in each nostril as directed  Dispense: 30 mL; Refill: 12    4. Anxiety  -     FLUoxetine (PROzac) 10 MG capsule; Take 1 capsule by mouth Daily.  Dispense: 30 capsule; Refill: 3    5. Panic attacks  -     LORazepam (ATIVAN) 0.5 MG tablet; Take 1 tablet by mouth Every 6 (Six) Hours As Needed for Anxiety for up to 6 days.  Dispense: 24 tablet; Refill: 0    6. Hx of cholecystectomy  Comments:   Patient has seen her surgeon few days back and as per the patient her surgeon does not think her symptoms related to cholecystectomy.     Other orders  -     Cancel: promethazine (PHENERGAN) suppository    Patient had similar symptoms before gallbladder removal.  Patient reported she actually went for cholecystectomy because she had nausea vomiting abdominal pain.  Patient has GI appointment in coming week.  Patient is counseled for alternative methods to call herself that is yoga or meditation.    Needs yearly Flu vaccine  Dental visit and exam every year  Seat Belt always when driving  or riding cars  Safe sex life to avoid STD  Healthy heart diet  Exercise 3 times a week    Follow Up  6 months

## 2021-10-07 NOTE — OUTREACH NOTE
Call Center TCM Note      Responses   [facility] patient discharged from?  [place]   Does the patient have one of the following disease processes/diagnoses(primary or secondary)?  Other   TCM attempt successful?  No   Unsuccessful attempts  Attempt 2   Call Status Comments  Patient   Call end time  [id number]   Meds reviewed with patient/caregiver?  Yes   Is the patient having any side effects they believe may be caused by any medication additions or changes?  No   Does the patient have all medications ordered at discharge?  No   Prescription comments  Erythromycin coming in today at pharmacy   Is the patient taking all medications as directed (includes completed medication regime)?  N/A   Does the patient have a primary care provider?   Yes   Does the patient have an appointment with their PCP within 7 days of discharge?  Yes   Comments regarding PCP  [facility] fu appt on 10/7/21 at 8:00 AM   Has the patient kept scheduled appointments due by today?  N/A   Psychosocial issues?  No   Did the patient receive a copy of their discharge instructions?  Yes   Nursing interventions  Reviewed instructions with patient   What is the patient's perception of their health status since discharge?  Improving   Is the patient/caregiver able to teach back signs and symptoms related to disease process for when to call PCP?  Yes   Is the patient/caregiver able to teach back signs and symptoms related to disease process for when to call 911?  Yes   Is the patient/caregiver able to teach back the hierarchy of who to call/visit for symptoms/problems? PCP, Specialist, Home health nurse, Urgent Care, ED, 911  Yes   If the patient is a current smoker, are they able to teach back resources for cessation?  Not a smoker   TCM call completed?  Yes   Wrap up additional comments  Pt states she is feeling much better. Pt is waiting for antibiotic to be delivered to pharmacy. Pt had PCP [facility] fu appt today. Questions/concerns addressed.           Marguerite Manzano RN    10/7/2021, 16:38 EDT

## 2021-10-07 NOTE — OUTREACH NOTE
Call Center TCM Note      Responses   [facility] patient discharged from?  [place]   Does the patient have one of the following disease processes/diagnoses(primary or secondary)?  Other   TCM attempt successful?  No   Unsuccessful attempts  Attempt 1   Does the patient have a primary care provider?   Yes   Does the patient have an appointment with their PCP within 7 days of discharge?  Yes   Comments regarding PCP  hospital fu appt on 10/7/21 at 8:00 AM   Has the patient kept scheduled appointments due by today?  N/A          Marguerite Manzano RN    10/7/2021, 09:05 EDT

## 2021-10-07 NOTE — CASE MANAGEMENT/SOCIAL WORK
Case Management Discharge Note      Final Note: Home-no needs identified. Estelavalarie Cervantes CSW         Selected Continued Care - Discharged on 10/6/2021 Admission date: 10/3/2021 - Discharge disposition: Home or Self Care    Destination    No services have been selected for the patient.              Durable Medical Equipment    No services have been selected for the patient.              Dialysis/Infusion    No services have been selected for the patient.              Home Medical Care    No services have been selected for the patient.              Therapy    No services have been selected for the patient.              Community Resources    No services have been selected for the patient.              Community & DME    No services have been selected for the patient.                       Final Discharge Disposition Code: 01 - home or self-care

## 2021-10-07 NOTE — TELEPHONE ENCOUNTER
----- Message from Jessica Lenz MD sent at 10/7/2021  3:03 PM EDT -----  Biopsies from her EGD were normal; this was done of the small bowel.  No findings of celiac diseaseShe is scheduled with [place] on the 11th and should keep that appointment to follow-up her gastroparesis

## 2021-10-07 NOTE — TELEPHONE ENCOUNTER
Patient called and asked if her Erythromycin RX could be sent over to [facility] in [place] (phone [phone]) instead of the [facility] pharmacy. She states it was very expensive and [facility] had it for cheaper.     Per Dr. Imelda card to call in to pharmacy. I have left a voicemail with [facility] and I contacted patient. She is aware we have called in RX and that this was for a 1 time fill only. Will need to contact her Gastro office for future refills.

## 2021-10-07 NOTE — PROGRESS NOTES
Biopsies from her EGD were normal; this was done of the small bowel.  No findings of celiac diseaseShe is scheduled with Virginia on the 11th and should keep that appointment to follow-up her gastroparesis

## 2021-10-11 ENCOUNTER — PATIENT ROUNDING (BHMG ONLY) (OUTPATIENT)
Dept: FAMILY MEDICINE CLINIC | Facility: CLINIC | Age: 51
End: 2021-10-11

## 2021-10-11 ENCOUNTER — OFFICE VISIT (OUTPATIENT)
Dept: GASTROENTEROLOGY | Facility: CLINIC | Age: 51
End: 2021-10-11

## 2021-10-11 VITALS — BODY MASS INDEX: 19.74 KG/M2 | TEMPERATURE: 97.1 F | WEIGHT: 115.6 LBS | HEIGHT: 64 IN

## 2021-10-11 DIAGNOSIS — F41.9 ANXIETY DISORDER, UNSPECIFIED TYPE: ICD-10-CM

## 2021-10-11 DIAGNOSIS — K31.84 GASTROPARESIS: Primary | ICD-10-CM

## 2021-10-11 PROCEDURE — 99213 OFFICE O/P EST LOW 20 MIN: CPT | Performed by: PHYSICIAN ASSISTANT

## 2021-10-11 RX ORDER — PROMETHAZINE HYDROCHLORIDE 25 MG/1
25 TABLET ORAL EVERY 6 HOURS PRN
Qty: 30 TABLET | Refills: 1 | Status: SHIPPED | OUTPATIENT
Start: 2021-10-11 | End: 2021-11-29

## 2021-10-11 RX ORDER — FAMOTIDINE 40 MG/1
40 TABLET, FILM COATED ORAL 2 TIMES DAILY
Qty: 60 TABLET | Refills: 1 | Status: SHIPPED | OUTPATIENT
Start: 2021-10-11 | End: 2022-07-05

## 2021-10-11 NOTE — PROGRESS NOTES
October 11, 2021    Hello, may I speak with Mary Jensen?    My name is Emmanuel Ling     I am  with [facility]  [address] [facility] [address] [phone].    Before we get started may I verify your date of birth? 1970    I am calling to officially welcome you to our practice and ask about your recent visit. Is this a good time to talk? yes    Tell me about your visit with us. What things went well?  Patient loved her visit to the practice, she praised both staff and Dr. French for being so nice. Patient also stated that her visit was speedy. She did not have to wait long to be called back by a medical assistant and to see Dr. French.        We're always looking for ways to make our patients' experiences even better. Do you have recommendations on ways we may improve?  no    Overall were you satisfied with your first visit to our practice? yes       I appreciate you taking the time to speak with me today. Is there anything else I can do for you? no      Thank you, and have a great day.

## 2021-10-11 NOTE — PROGRESS NOTES
Chief Complaint  Nausea, Fatigue, and Diarrhea    Subjective          History of Present Illness    Mary Jensen is a  51 y.o. female presents for hospital follow-up for nausea and vomiting and abdominal pain.  She was admitted from 10/3/2021-10/6/2021.  She was diagnosed with gastroparesis (see GES results below).  She has not a diabetic and has no neurological diagnosis. She saw Dr. Lenz in consultation.    She presents today with persistent nausea that is mostly in the morning.  She is no longer vomiting.  And her abdominal pain is better.  She also has significant stress and anxiety that she reports is situational.  She feels this is contributing to her stomach symptoms.  She does get relief in her stomach symptoms when she takes Ativan.  She was started on Prozac by her PCP recently and is seen a counselor for the anxiety.  She is looking for a psychiatrist to refill her Ativan and manage her other psych medications.  She is tearful today regarding her emotional state.    While hospitalized she only found relief with promethazine suppositories.  Oral Zofran and oral promethazine did not help her prehospital.  She would likely benefit from scopolamine patch however she does not have prescription drug benefits.  She was started on erythromycin however she did not tolerate it so she only took 1 dose.    She did meet with a dietitian in the hospital to go over gastroparesis diet.  She is eating multiple small meals per day.  She has lost 2 more pounds since 10/7.  She is 115 pounds today.    Gastric emptying scan did demonstrate evidence of delayed gastric emptying.  Gastric retention at 4 hours was 59%.  She also showed delays at 2 and 3 hours.    EGD on 10/4/2021 by Dr. Francis was essentially normal.  She did show white plaques in the esophagus.   Pathology negative for celiac disease.    10/3/2021 labs showed a normal CBC, lipase slightly elevated at 68, CMP normal  CT abdomen pelvis on 10/1 was  "unremarkable.  HIDA scan on 10/3 did not show any evidence of biliary leak s/p cholecystectomy.    Objective   Vital Signs:   Temp 97.1 °F (36.2 °C)   Ht 162.6 cm (64\")   Wt 52.4 kg (115 lb 9.6 oz)   BMI 19.84 kg/m²       Physical Exam  Vitals reviewed.   Constitutional:       General: She is awake. She is not in acute distress.     Appearance: Normal appearance. She is well-developed and well-groomed.   HENT:      Head: Normocephalic and atraumatic.   Pulmonary:      Effort: Pulmonary effort is normal. No respiratory distress.   Skin:     Coloration: Skin is not pale.   Neurological:      Mental Status: She is alert and oriented to person, place, and time.      Gait: Gait normal.   Psychiatric:         Mood and Affect: Mood is anxious. Affect is tearful.         Speech: Speech normal.         Behavior: Behavior is cooperative.         Judgment: Judgment normal.          Result Review :   The following data was reviewed by: Virginia Hurd PA-C on 10/11/2021:  CMP    CMP 9/26/21 10/1/21 10/3/21   Glucose 123 (A) 96 98   BUN 10 12 8   Creatinine 0.90 0.82 0.77   eGFR Non African Am 65 73 78   Sodium 136 139 144   Potassium 4.0 3.9 3.7   Chloride 103 102 108 (A)   Calcium 9.2 10.2 9.7   Albumin 4.10 4.80 4.70   Total Bilirubin 0.3 0.3 0.2   Alkaline Phosphatase 84 112 102   AST (SGOT) 39 (A) 17 13   ALT (SGPT) 31 21 15   (A) Abnormal value            CBC w/diff    CBC w/Diff 9/26/21 10/1/21 10/3/21   WBC 6.51 7.70 7.39   RBC 4.16 4.81 4.38   Hemoglobin 12.9 14.7 13.6   Hematocrit 39.1 46.1 41.9   MCV 94.0 95.8 95.7   MCH 31.0 30.6 31.1   MCHC 33.0 31.9 32.5   RDW 12.1 (A) 12.4 12.3   Platelets 325 470 (A) 435   Neutrophil Rel % 75.5 66.4 67.1   Immature Granulocyte Rel % 0.3 0.3 0.3   Lymphocyte Rel % 17.7 (A) 26.0 24.1   Monocyte Rel % 6.3 6.0 7.2   Eosinophil Rel % 0.0 (A) 0.8 0.8   Basophil Rel % 0.2 0.5 0.5   (A) Abnormal value                  Assessment and Plan    Diagnoses and all orders for this " visit:    1. Gastroparesis (Primary)    2. Anxiety disorder, unspecified type    Other orders  -     promethazine (PHENERGAN) 25 MG tablet; Take 1 tablet by mouth Every 6 (Six) Hours As Needed for Nausea or Vomiting.  Dispense: 30 tablet; Refill: 1  -     famotidine (PEPCID) 40 MG tablet; Take 1 tablet by mouth 2 (Two) Times a Day.  Dispense: 60 tablet; Refill: 1    I went through her bag of medicines today.  She will stop the Reglan due to concerns for worsening her mood as well as possible tardive dyskinesia (no evidence of this currently).  She will continue famotidine which I increased to 40 mg twice daily if this helps her nausea.  She can continue promethazine oral or suppositories as needed for her nausea.    We did discuss her mood disorder and how this contributes to her GI condition.  I do suspect this is contributing to the gastroparesis.  She will continue with her counselor but also recommended she seek care at an outpatient/inpatient place like in [facility].  It will be important to get her psych medications adjusted appropriately in order to rule out this as a source for her GI condition.      Follow Up   Return in about 2 weeks (around 10/25/2021) for [place].    Benjamin dictation used throughout this note.     Virginia Hurd PA-C    Humira Pregnancy And Lactation Text: This medication is Pregnancy Category B and is considered safe during pregnancy. It is unknown if this medication is excreted in breast milk.

## 2021-10-12 ENCOUNTER — TELEPHONE (OUTPATIENT)
Dept: GASTROENTEROLOGY | Facility: CLINIC | Age: 51
End: 2021-10-12

## 2021-10-12 NOTE — TELEPHONE ENCOUNTER
----- Message from Virginia Hurd PA-C sent at 10/12/2021  8:16 AM EDT -----  Regarding: Referral to [facility]  RN's: please let patient know that Dr. Lenz recommended referral to [facility]. They specialize in gastroparesis. It can take a while to get in there so we can work the referral now. Still keep appt with our office.     Vandana: please refer to [facility]. Reminder: there is no way to put in order for this referral in the system.     Thanks,  Virginia    ----- Message -----  From: Jessica Lenz MD  Sent: 10/12/2021   7:35 AM EDT  To: Virginia Hurd PA-C    I would also refer her to the [facility] motility clinic, Dr. Pelletier.  It will take a while but I think this needs to happen.  thanks  ----- Message -----  From: Virginia Hurd PA-C  Sent: 10/11/2021  12:01 PM EDT  To: Jessica Lenz MD    51-year-old hospital follow-up for nausea/vomiting abdominal pain.  She was diagnosed with gastroparesis by positive GES.  She not a diabetic and does not have any neurological diagnoses.  She has significant anxiety and panic disorder that she reports is situational.  I think this is contributing to her GI symptoms.  She is doing fairly well with mostly morning nausea but no vomiting.  She does not get relief with Zofran so I told her to continue the promethazine.  I also discussed jerson, peppermint, and B6 as options.  I recommended that she check out [facility] which has intensive outpatient therapy for mood disorders that would likely be helpful for her.  She does get benefit in her GI symptoms when taking Ativan.  Her PCP also placed her on SSRI.  I will see her back in 2 weeks.

## 2021-10-13 ENCOUNTER — TELEPHONE (OUTPATIENT)
Dept: FAMILY MEDICINE CLINIC | Facility: CLINIC | Age: 51
End: 2021-10-13

## 2021-10-13 NOTE — TELEPHONE ENCOUNTER
Call to pt . Advise per DULCE Hurd note.  Verb understanding.    States had to stay home from work today 2nd uncontrolled nausea.  Phenergan not helping.  Erythromycin making it worse.  Asking how to manage persistent nausea.     DULCE Hurd out of office - message to PHYLLIS Hamilton.

## 2021-10-14 ENCOUNTER — OFFICE VISIT (OUTPATIENT)
Dept: FAMILY MEDICINE CLINIC | Facility: CLINIC | Age: 51
End: 2021-10-14

## 2021-10-14 ENCOUNTER — TELEPHONE (OUTPATIENT)
Dept: FAMILY MEDICINE CLINIC | Facility: CLINIC | Age: 51
End: 2021-10-14

## 2021-10-14 VITALS
SYSTOLIC BLOOD PRESSURE: 110 MMHG | TEMPERATURE: 98 F | HEIGHT: 64 IN | RESPIRATION RATE: 18 BRPM | WEIGHT: 114 LBS | HEART RATE: 89 BPM | OXYGEN SATURATION: 99 % | BODY MASS INDEX: 19.46 KG/M2 | DIASTOLIC BLOOD PRESSURE: 84 MMHG

## 2021-10-14 DIAGNOSIS — F41.9 ANXIETY: ICD-10-CM

## 2021-10-14 DIAGNOSIS — R11.0 NAUSEA: Primary | ICD-10-CM

## 2021-10-14 DIAGNOSIS — K31.84 GASTROPARESIS: ICD-10-CM

## 2021-10-14 PROCEDURE — 99213 OFFICE O/P EST LOW 20 MIN: CPT | Performed by: STUDENT IN AN ORGANIZED HEALTH CARE EDUCATION/TRAINING PROGRAM

## 2021-10-14 RX ORDER — FLUOXETINE HYDROCHLORIDE 20 MG/1
20 CAPSULE ORAL DAILY
Qty: 90 CAPSULE | Refills: 3 | Status: SHIPPED | OUTPATIENT
Start: 2021-10-14 | End: 2022-07-05

## 2021-10-14 RX ORDER — ONDANSETRON 4 MG/1
4 TABLET, ORALLY DISINTEGRATING ORAL EVERY 8 HOURS PRN
Qty: 90 TABLET | Refills: 1 | Status: SHIPPED | OUTPATIENT
Start: 2021-10-14 | End: 2021-11-29

## 2021-10-14 NOTE — TELEPHONE ENCOUNTER
Caller: Mary Jensen    Relationship: Self    Best call back number: [phone]     What is the best time to reach you: ANYTIME    Who are you requesting to speak with  MA OR DOCTOR        What was the call regarding: SHE WAS WANTING TO ASK WHAT KINDS OF BLOOD TESTS DOCTOR IS ORDERING UP FOR HER.    Do you require a callback: YES

## 2021-10-14 NOTE — PROGRESS NOTES
"Chief Complaint  Anxiety (she would like meds increased or changed ) and Panic Attack (she is unable to eat,nausea)    Subjective          Mary Jensen presents to [facility]  For anxiety follow-up.  Patient reported Prozac 10 mg is not helping her and since she is not able to eat proper meals which is leading to weight loss and its increasing her anxiety.  Patient reported when she went to the GI doctor they did not agree that all these problems are related to anxiety.  She was recommended to undergo retesting in order to finalize the diagnosis.  She was also given referral to motility clinic.  As per patient history of food intake in the last 4 days she is only taking 1 meal per day.  She complained she always feel full and she always have a fear that if she will eat more she will have abdominal pain, nausea, vomiting.  And this fear give her anxiety and only medication right now helping her is lorazepam which she taking 2 tablets (.5 each) daily.      Objective   Vital Signs:   /84 (BP Location: Left arm, Patient Position: Sitting)   Pulse 89   Temp 98 °F (36.7 °C) (Oral)   Resp 18   Ht 162.6 cm (64.02\")   Wt 51.7 kg (114 lb)   SpO2 99%   BMI 19.56 kg/m²     Physical Exam  HENT:      Head: Normocephalic and atraumatic.      Mouth/Throat:      Mouth: Mucous membranes are moist.      Pharynx: Oropharynx is clear.   Eyes:      Extraocular Movements: Extraocular movements intact.      Conjunctiva/sclera: Conjunctivae normal.      Pupils: Pupils are equal, round, and reactive to light.   Cardiovascular:      Rate and Rhythm: Normal rate and regular rhythm.   Pulmonary:      Effort: Pulmonary effort is normal.      Breath sounds: Normal breath sounds.   Abdominal:      General: Bowel sounds are normal.      Palpations: Abdomen is soft.      Tenderness: There is no abdominal tenderness. There is no guarding.      Comments: Scar marks present for cholecystectomy which are clean " noninfected.  And healing   Musculoskeletal:         General: Normal range of motion.      Cervical back: Neck supple.   Skin:     General: Skin is warm.      Capillary Refill: Capillary refill takes less than 2 seconds.   Neurological:      General: No focal deficit present.      Mental Status: She is alert and oriented to person, place, and time. Mental status is at baseline.   Psychiatric:         Mood and Affect: Mood normal.        Result Review :     CMP    CMP 9/26/21 10/1/21 10/3/21   Glucose 123 (A) 96 98   BUN 10 12 8   Creatinine 0.90 0.82 0.77   eGFR Non African Am 65 73 78   Sodium 136 139 144   Potassium 4.0 3.9 3.7   Chloride 103 102 108 (A)   Calcium 9.2 10.2 9.7   Albumin 4.10 4.80 4.70   Total Bilirubin 0.3 0.3 0.2   Alkaline Phosphatase 84 112 102   AST (SGOT) 39 (A) 17 13   ALT (SGPT) 31 21 15   (A) Abnormal value            CBC    CBC 9/26/21 10/1/21 10/3/21   WBC 6.51 7.70 7.39   RBC 4.16 4.81 4.38   Hemoglobin 12.9 14.7 13.6   Hematocrit 39.1 46.1 41.9   MCV 94.0 95.8 95.7   MCH 31.0 30.6 31.1   MCHC 33.0 31.9 32.5   RDW 12.1 (A) 12.4 12.3   Platelets 325 470 (A) 435   (A) Abnormal value                          Assessment and Plan    Diagnoses and all orders for this visit:    1. Nausea (Primary)  Comments:  Patient is advised to take small meals especially liquid meats throughout the day if she is not able to tolerate solid food.  Zofran as needed.  Orders:  -     ondansetron ODT (ZOFRAN-ODT) 4 MG disintegrating tablet; Place 1 tablet on the tongue Every 8 (Eight) Hours As Needed for Nausea or Vomiting.  Dispense: 90 tablet; Refill: 1    2. Anxiety  Comments:  Today fluoxetine dose has increased to 20 mg and patient is asked to use Ativan as needed for anxiety attacks  Orders:  -     FLUoxetine (PROzac) 20 MG capsule; Take 1 capsule by mouth Daily.  Dispense: 90 capsule; Refill: 3    3. Gastroparesis  Comments:  Patient counseled to undergo testing as per GI recommendation because GI issues  is causing patient anxiety which further worsen GI problem    Almost 30 minutes were spent counseling patient that anxiety can contribute GI symptoms along with other pathology so she should go for all the test recommended by gastroenterology and she should follow motility clinic also to find out the cause of gastroparesis.   also accompanied patient and after extensive counseling both patient and  agrees to undergo all the testing to find out the cause of gastroparesis.  Patient was worried that only medication helped her at this time is Ativan and she will need refills in the future and she is not able to get appointment with psychiatrist..  It was assured that her medication will be refilled on as needed purposes.  Patient has GI appointment coming on 19th this month.          Follow Up   Return if symptoms worsen or fail to improve.  Patient was given instructions and counseling regarding her condition or for health maintenance advice. Please see specific information pulled into the AVS if appropriate.

## 2021-10-15 NOTE — TELEPHONE ENCOUNTER
John Childress'Sarah, RegSched Rep  [facility] [address] [place]  Phone Number: [phone]     Patient's  is returning call. Please give patient a call back.      Call to spouse, Jefferson (see hipaa).  States pt had such a severe panic attack when had GES completed during recent hospitalization that it took 4 nurses to calm her down.  States that, in combination with other meds that she was on during the hospitalization, makes them question the gastroparesis dx.      States understood with o/v of 10/11 that symptoms would be approached from the standpoint of panic attacks - rather than proceeding with gastroparesis dx and tx.  Therefore, concerned that pt has now been referred to [facility].  Would like to better understand why this occurred.  Advise that DULCE Hurd out of office today- will send message to address upon return.  Verb understanding - will await reply.     Advise per PHYLLIS Hamilton note.  Verb understanding.  States has used oral zofran in the past without significant improvement.  Using phenergan supp with mixed response.

## 2021-10-18 NOTE — TELEPHONE ENCOUNTER
Virginia Hurd PA-C  to Me    SM      6:31 PM   and spoke to him.  I explained that referring to gastric motility clinic is a proactive approach given it takes a while to get in with them.  Worse case scenario she gets better and we will cancel it.  He reports that this made her very anxious because she thought that there was something more wrong.  I apologized and gave him above explanation.  He understood.  He will call back if there is any more concerns.     FYI.     **Message noted.  Madeline Childress RN.

## 2021-11-29 ENCOUNTER — OFFICE VISIT (OUTPATIENT)
Dept: OBSTETRICS AND GYNECOLOGY | Facility: CLINIC | Age: 51
End: 2021-11-29

## 2021-11-29 VITALS
HEIGHT: 64 IN | WEIGHT: 121 LBS | DIASTOLIC BLOOD PRESSURE: 76 MMHG | SYSTOLIC BLOOD PRESSURE: 110 MMHG | BODY MASS INDEX: 20.66 KG/M2

## 2021-11-29 DIAGNOSIS — Z78.0 POSTMENOPAUSAL: ICD-10-CM

## 2021-11-29 DIAGNOSIS — R68.82 DECREASED LIBIDO: ICD-10-CM

## 2021-11-29 DIAGNOSIS — Z13.9 SCREENING DUE: ICD-10-CM

## 2021-11-29 DIAGNOSIS — Z01.419 PAP SMEAR, LOW-RISK: ICD-10-CM

## 2021-11-29 DIAGNOSIS — Z01.419 ROUTINE GYNECOLOGICAL EXAMINATION: Primary | ICD-10-CM

## 2021-11-29 DIAGNOSIS — Z11.51 SPECIAL SCREENING EXAMINATION FOR HUMAN PAPILLOMAVIRUS (HPV): ICD-10-CM

## 2021-11-29 LAB
BILIRUB BLD-MCNC: NEGATIVE MG/DL
CLARITY, POC: CLEAR
COLOR UR: YELLOW
GLUCOSE UR STRIP-MCNC: NEGATIVE MG/DL
KETONES UR QL: NEGATIVE
LEUKOCYTE EST, POC: NEGATIVE
NITRITE UR-MCNC: NEGATIVE MG/ML
PH UR: 6 [PH] (ref 5–8)
PROT UR STRIP-MCNC: NEGATIVE MG/DL
RBC # UR STRIP: NEGATIVE /UL
SP GR UR: 1.03 (ref 1–1.03)
UROBILINOGEN UR QL: NORMAL

## 2021-11-29 PROCEDURE — 81002 URINALYSIS NONAUTO W/O SCOPE: CPT | Performed by: NURSE PRACTITIONER

## 2021-11-29 PROCEDURE — 99396 PREV VISIT EST AGE 40-64: CPT | Performed by: NURSE PRACTITIONER

## 2021-11-29 RX ORDER — CLONAZEPAM 0.5 MG/1
TABLET ORAL
COMMUNITY
Start: 2021-10-25

## 2021-11-30 LAB
25(OH)D3+25(OH)D2 SERPL-MCNC: 37.8 NG/ML (ref 30–100)
CHOLEST SERPL-MCNC: 239 MG/DL (ref 100–199)
ESTRADIOL SERPL-MCNC: <5 PG/ML
FSH SERPL-ACNC: 71.9 MIU/ML
HDLC SERPL-MCNC: 62 MG/DL
LDLC SERPL CALC-MCNC: 159 MG/DL (ref 0–99)
TRIGL SERPL-MCNC: 104 MG/DL (ref 0–149)
TSH SERPL DL<=0.005 MIU/L-ACNC: 1.89 UIU/ML (ref 0.45–4.5)
VLDLC SERPL CALC-MCNC: 18 MG/DL (ref 5–40)

## 2021-12-01 LAB
CYTOLOGIST CVX/VAG CYTO: NORMAL
CYTOLOGY CVX/VAG DOC CYTO: NORMAL
CYTOLOGY CVX/VAG DOC THIN PREP: NORMAL
DX ICD CODE: NORMAL
HIV 1 & 2 AB SER-IMP: NORMAL
HPV I/H RISK 4 DNA CVX QL PROBE+SIG AMP: NEGATIVE
OTHER STN SPEC: NORMAL
STAT OF ADQ CVX/VAG CYTO-IMP: NORMAL

## 2021-12-01 NOTE — TELEPHONE ENCOUNTER
Patient was seen and evaluated by physician 10/14/2021. Nothing further needed at this time. I will close this encounter.    Emmanuel

## 2021-12-11 PROBLEM — Z01.419 ROUTINE GYNECOLOGICAL EXAMINATION: Status: ACTIVE | Noted: 2021-12-11

## 2021-12-11 PROBLEM — Z78.0 POSTMENOPAUSAL: Status: ACTIVE | Noted: 2021-12-11

## 2022-07-05 ENCOUNTER — OFFICE VISIT (OUTPATIENT)
Dept: FAMILY MEDICINE CLINIC | Facility: CLINIC | Age: 52
End: 2022-07-05

## 2022-07-05 VITALS
HEIGHT: 64 IN | BODY MASS INDEX: 20.76 KG/M2 | HEART RATE: 85 BPM | DIASTOLIC BLOOD PRESSURE: 77 MMHG | TEMPERATURE: 97.5 F | OXYGEN SATURATION: 100 % | RESPIRATION RATE: 18 BRPM | SYSTOLIC BLOOD PRESSURE: 117 MMHG

## 2022-07-05 DIAGNOSIS — E78.2 MIXED HYPERLIPIDEMIA: Primary | ICD-10-CM

## 2022-07-05 DIAGNOSIS — Z00.00 ANNUAL PHYSICAL EXAM: ICD-10-CM

## 2022-07-05 DIAGNOSIS — J32.0 CHRONIC MAXILLARY SINUSITIS: ICD-10-CM

## 2022-07-05 DIAGNOSIS — F41.9 ANXIETY DISORDER, UNSPECIFIED TYPE: ICD-10-CM

## 2022-07-05 PROBLEM — K31.84 GASTROPARESIS: Status: RESOLVED | Noted: 2021-10-11 | Resolved: 2022-07-05

## 2022-07-05 PROCEDURE — 99213 OFFICE O/P EST LOW 20 MIN: CPT | Performed by: STUDENT IN AN ORGANIZED HEALTH CARE EDUCATION/TRAINING PROGRAM

## 2022-07-05 RX ORDER — AZELASTINE 1 MG/ML
2 SPRAY, METERED NASAL 2 TIMES DAILY
Qty: 30 ML | Refills: 12 | Status: SHIPPED | OUTPATIENT
Start: 2022-07-05

## 2022-07-05 NOTE — PROGRESS NOTES
"Chief Complaint  Anxiety (6mo fu)    Subjective        Mary Jensen presents to [facility]  For follow-up on her medical issues.    Patient stated her nausea has improved remarkably and currently she does not have any GI issues.  Patient stated she is still taking Klonopin as needed for her anxiety issue.  Last time she was prescribed Klonopin was in December last year and still she has pills left which she uses sometimes at night.  Currently she is asymptomatic with having 1 or 2 bowel movements a day.  Bowels are fully formed soft.  Patient also expressed her concern regarding medical information, she reported she never had a vomiting in past but some of doctors documentation saying she also presented with vomiting to ER and she thinks that information is not correct.  Review of system is negative for fever, headache, chest pain, shortness of breath, palpitation, nausea, vomiting, any recent change in bladder habits.        Objective   Vital Signs:  /77 (BP Location: Left arm, Patient Position: Sitting)   Pulse 85   Temp 97.5 °F (36.4 °C) (Oral)   Resp 18   Ht 162.6 cm (64.02\")   SpO2 100%   BMI 20.76 kg/m²   Estimated body mass index is 20.76 kg/m² as calculated from the following:    Height as of this encounter: 162.6 cm (64.02\").    Weight as of 11/29/21: 54.9 kg (121 lb).          Physical Exam  HENT:      Head: Normocephalic and atraumatic.      Mouth/Throat:      Mouth: Mucous membranes are moist.      Pharynx: Oropharynx is clear.   Eyes:      Extraocular Movements: Extraocular movements intact.      Conjunctiva/sclera: Conjunctivae normal.      Pupils: Pupils are equal, round, and reactive to light.   Cardiovascular:      Rate and Rhythm: Normal rate and regular rhythm.   Pulmonary:      Effort: Pulmonary effort is normal.      Breath sounds: Normal breath sounds.   Abdominal:      General: Bowel sounds are normal.      Palpations: Abdomen is soft. "   Musculoskeletal:         General: Normal range of motion.      Cervical back: Neck supple.   Skin:     General: Skin is warm.      Capillary Refill: Capillary refill takes less than 2 seconds.   Neurological:      General: No focal deficit present.      Mental Status: She is alert and oriented to person, place, and time. Mental status is at baseline.   Psychiatric:         Mood and Affect: Mood normal.        Result Review :    CMP    CMP 9/26/21 10/1/21 10/3/21   Glucose 123 (A) 96 98   BUN 10 12 8   Creatinine 0.90 0.82 0.77   eGFR Non African Am 65 73 78   Sodium 136 139 144   Potassium 4.0 3.9 3.7   Chloride 103 102 108 (A)   Calcium 9.2 10.2 9.7   Albumin 4.10 4.80 4.70   Total Bilirubin 0.3 0.3 0.2   Alkaline Phosphatase 84 112 102   AST (SGOT) 39 (A) 17 13   ALT (SGPT) 31 21 15   (A) Abnormal value            CBC    CBC 9/26/21 10/1/21 10/3/21   WBC 6.51 7.70 7.39   RBC 4.16 4.81 4.38   Hemoglobin 12.9 14.7 13.6   Hematocrit 39.1 46.1 41.9   MCV 94.0 95.8 95.7   MCH 31.0 30.6 31.1   MCHC 33.0 31.9 32.5   RDW 12.1 (A) 12.4 12.3   Platelets 325 470 (A) 435   (A) Abnormal value            Lipid Panel    Lipid Panel 11/29/21   Total Cholesterol 239 (A)   Triglycerides 104   HDL Cholesterol 62   VLDL Cholesterol 18   LDL Cholesterol  159 (A)   (A) Abnormal value            TSH    TSH 11/29/21   TSH 1.890                     Assessment and Plan   Diagnoses and all orders for this visit:    1. Mixed hyperlipidemia (Primary)  Comments:  Lipid panel and other lab orders has been placed, will follow up in 6-month, not on any medication currently, currently on lifestyle modification    2. Chronic maxillary sinusitis  Comments:  Requested refill nasal spray, given today  Orders:  -     azelastine (ASTELIN) 0.1 % nasal spray; 2 sprays into the nostril(s) as directed by provider 2 (Two) Times a Day. Use in each nostril as directed  Dispense: 30 mL; Refill: 12    3. Annual physical exam  -     CBC Auto Differential;  Future  -     Comprehensive Metabolic Panel; Future  -     Lipid Panel With / Chol / HDL Ratio; Future  -     TSH; Future    4. Anxiety disorder, unspecified type  Comments:  On Klonopin 0.5 mg p.o. daily as needed, patient stated she is not using Prozac anymore.  Following psychiatrist     Patient GI issues is completely resolved, most likely linked with patient stressful condition.    Plan to do annual physical exam visit in 6 months along with patient labs.         Follow Up   Return in about 6 months (around 1/5/2023).  Patient was given instructions and counseling regarding her condition or for health maintenance advice. Please see specific information pulled into the AVS if appropriate.

## 2024-03-04 ENCOUNTER — TELEPHONE (OUTPATIENT)
Dept: FAMILY MEDICINE CLINIC | Facility: CLINIC | Age: 54
End: 2024-03-04

## 2024-03-04 NOTE — TELEPHONE ENCOUNTER
Caller: Mary Jensen    Relationship to patient: Self    Patient is needing: PATIENT STATES THAT SHE DOES NOT WANT ANY MEDICAL RECORDS RELEASED TO [employer].  SHE STATES SHE WAS GOING TO USE THEM FOR LIFE INSURANCE, BUT DOES NOT WANT TO USE THEM AS SHE SUSPECTS SOMETHING UNETHICAL,  PATIENT IS ASKING THAT THE DR. MARCELO/[employer] NOT SEND THE REQUEST ON TO CENTRALIZED MEDICAL RECORDS OR FILL THE REQUEST FOR [employer].     PATIENT IS UNCERTAIN IF SHE COMPLETED A RELEASE OF INFORMATION FOR THIS COMPANY OR NOT.

## (undated) DEVICE — APPL CHLORAPREP HI/LITE 26ML ORNG

## (undated) DEVICE — ADAPT CLN BIOGUARD AIR/H2O DISP

## (undated) DEVICE — TRAP FLD MINIVAC MEGADYNE 100ML

## (undated) DEVICE — SOL NACL 0.9PCT 1000ML

## (undated) DEVICE — FRCP BX RADJAW4 NDL 2.8 240CM LG OG BX40

## (undated) DEVICE — CATH CHOLANG 4.5F18IN BRGNDY

## (undated) DEVICE — PENCL E/S ULTRAVAC TELESCP NOSE HOLSTR 10FT

## (undated) DEVICE — ENDOPOUCH RETRIEVER SPECIMEN RETRIEVAL BAGS: Brand: ENDOPOUCH RETRIEVER

## (undated) DEVICE — KT ORCA ORCAPOD DISP STRL

## (undated) DEVICE — CONTAINER,SPECIMEN,OR STERILE,4OZ: Brand: MEDLINE

## (undated) DEVICE — SENSR O2 OXIMAX FNGR A/ 18IN NONSTR

## (undated) DEVICE — MSK PROC CURAPLEX O2 2/ADAPT 7FT

## (undated) DEVICE — CATH IV INSYTE AUTOGARD 14G 1 1/2IN ORNG

## (undated) DEVICE — STPCK 3WY D201 DISCOFIX

## (undated) DEVICE — BITEBLOCK OMNI BLOC

## (undated) DEVICE — LAPAROSCOPIC SMOKE FILTRATION SYSTEM: Brand: PALL LAPAROSHIELD® PLUS LAPAROSCOPIC SMOKE FILTRATION SYSTEM

## (undated) DEVICE — ENDOPATH XCEL BLADELESS TROCARS WITH STABILITY SLEEVES: Brand: ENDOPATH XCEL

## (undated) DEVICE — 30977 SEE SHARP - ENHANCED INTRAOPERATIVE LAPAROSCOPE CLEANING & DEFOGGING: Brand: 30977 SEE SHARP - ENHANCED INTRAOPERATIVE LAPAROSCOPE CLEANING & DEFOGGING

## (undated) DEVICE — ENDOPATH XCEL UNIVERSAL TROCAR STABLILITY SLEEVES: Brand: ENDOPATH XCEL

## (undated) DEVICE — LOU LAP CHOLE: Brand: MEDLINE INDUSTRIES, INC.

## (undated) DEVICE — ENDOPATH XCEL BLUNT TIP TROCARS WITH SMOOTH SLEEVES: Brand: ENDOPATH XCEL

## (undated) DEVICE — TUBING, SUCTION, 1/4" X 10', STRAIGHT: Brand: MEDLINE

## (undated) DEVICE — EXTENSION SET, MALE LUER LOCK ADAPTER WITH RETRACTABLE COLLAR

## (undated) DEVICE — GLV SURG BIOGEL LTX PF 8 1/2

## (undated) DEVICE — KTTNER ENDO BLNT DISSCT

## (undated) DEVICE — ENDOCUT SCISSOR TIP, DISPOSABLE: Brand: RENEW

## (undated) DEVICE — LN SMPL CO2 SHTRM SD STREAM W/M LUER

## (undated) DEVICE — DRP C/ARM 41X74IN

## (undated) DEVICE — DRAPE,REIN 53X77,STERILE: Brand: MEDLINE

## (undated) DEVICE — CANN O2 ETCO2 FITS ALL CONN CO2 SMPL A/ 7IN DISP LF

## (undated) DEVICE — 3M™ STERI-STRIP™ COMPOUND BENZOIN TINCTURE 40 BAGS/CARTON 4 CARTONS/CASE C1544: Brand: 3M™ STERI-STRIP™

## (undated) DEVICE — BRUSH CYTO COLONOSCOPE 7F3MM 240CM RED

## (undated) DEVICE — SUT MNCRYL PLS ANTIB UD 4/0 PS2 18IN

## (undated) DEVICE — VIOLET BRAIDED (POLYGLACTIN 910), SYNTHETIC ABSORBABLE SUTURE: Brand: COATED VICRYL